# Patient Record
Sex: FEMALE | Race: BLACK OR AFRICAN AMERICAN | NOT HISPANIC OR LATINO | Employment: UNEMPLOYED
[De-identification: names, ages, dates, MRNs, and addresses within clinical notes are randomized per-mention and may not be internally consistent; named-entity substitution may affect disease eponyms.]

---

## 2017-09-07 ENCOUNTER — HOSPITAL ENCOUNTER (OUTPATIENT)
Dept: LAB | Age: 22
Setting detail: SPECIMEN
Discharge: HOME | End: 2017-09-07

## 2017-12-31 ENCOUNTER — HEALTH MAINTENANCE LETTER (OUTPATIENT)
Age: 22
End: 2017-12-31

## 2018-09-18 ENCOUNTER — RECORDS - HEALTHEAST (OUTPATIENT)
Dept: LAB | Facility: CLINIC | Age: 23
End: 2018-09-18

## 2018-09-19 LAB
C TRACH DNA SPEC QL PROBE+SIG AMP: NEGATIVE
N GONORRHOEA DNA SPEC QL NAA+PROBE: NEGATIVE

## 2020-03-10 ENCOUNTER — HEALTH MAINTENANCE LETTER (OUTPATIENT)
Age: 25
End: 2020-03-10

## 2020-11-12 ENCOUNTER — OFFICE VISIT (OUTPATIENT)
Dept: URGENT CARE | Facility: URGENT CARE | Age: 25
End: 2020-11-12
Payer: COMMERCIAL

## 2020-11-12 ENCOUNTER — ANCILLARY PROCEDURE (OUTPATIENT)
Dept: GENERAL RADIOLOGY | Facility: CLINIC | Age: 25
End: 2020-11-12
Attending: PHYSICIAN ASSISTANT
Payer: COMMERCIAL

## 2020-11-12 VITALS
SYSTOLIC BLOOD PRESSURE: 132 MMHG | DIASTOLIC BLOOD PRESSURE: 87 MMHG | WEIGHT: 144 LBS | BODY MASS INDEX: 28.12 KG/M2 | TEMPERATURE: 98.4 F | OXYGEN SATURATION: 100 % | HEART RATE: 105 BPM
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
## 2020-11-12 DIAGNOSIS — R07.89 CHEST WALL PAIN: ICD-10-CM

## 2020-11-12 DIAGNOSIS — S29.011A MUSCLE STRAIN OF ANTERIOR CHEST WALL: ICD-10-CM

## 2020-11-12 DIAGNOSIS — S20.219A CONTUSION OF STERNUM, INITIAL ENCOUNTER: ICD-10-CM

## 2020-11-12 PROCEDURE — 71046 X-RAY EXAM CHEST 2 VIEWS: CPT | Performed by: RADIOLOGY

## 2020-11-12 PROCEDURE — 99203 OFFICE O/P NEW LOW 30 MIN: CPT | Performed by: PHYSICIAN ASSISTANT

## 2020-11-12 ASSESSMENT — ENCOUNTER SYMPTOMS
NECK PAIN: 0
WEAKNESS: 0
COUGH: 0
VOMITING: 0
FEVER: 0
WOUND: 0
NECK STIFFNESS: 0
EYES NEGATIVE: 1
LIGHT-HEADEDNESS: 0
NAUSEA: 0
MYALGIAS: 0
CHILLS: 0
BACK PAIN: 0
RHINORRHEA: 0
DIZZINESS: 0
HEADACHES: 0
BRUISES/BLEEDS EASILY: 0
JOINT SWELLING: 0
PALPITATIONS: 0
ARTHRALGIAS: 0
ENDOCRINE NEGATIVE: 1
RESPIRATORY NEGATIVE: 1
DIARRHEA: 0
SORE THROAT: 0
HEMATOLOGIC/LYMPHATIC NEGATIVE: 1
ALLERGIC/IMMUNOLOGIC NEGATIVE: 1
SHORTNESS OF BREATH: 0
MUSCULOSKELETAL NEGATIVE: 1
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
## 2020-12-27 ENCOUNTER — HEALTH MAINTENANCE LETTER (OUTPATIENT)
Age: 25
End: 2020-12-27

## 2021-04-24 ENCOUNTER — HEALTH MAINTENANCE LETTER (OUTPATIENT)
Age: 26
End: 2021-04-24

## 2021-10-09 ENCOUNTER — HEALTH MAINTENANCE LETTER (OUTPATIENT)
Age: 26
End: 2021-10-09

## 2021-12-29 ENCOUNTER — OFFICE VISIT (OUTPATIENT)
Dept: URGENT CARE | Facility: URGENT CARE | Age: 26
End: 2021-12-29
Payer: COMMERCIAL

## 2021-12-29 VITALS
SYSTOLIC BLOOD PRESSURE: 117 MMHG | BODY MASS INDEX: 26.05 KG/M2 | OXYGEN SATURATION: 100 % | WEIGHT: 133.4 LBS | HEART RATE: 66 BPM | TEMPERATURE: 98.5 F | DIASTOLIC BLOOD PRESSURE: 79 MMHG

## 2021-12-29 DIAGNOSIS — Z33.1 PREGNANT STATE, INCIDENTAL: ICD-10-CM

## 2021-12-29 DIAGNOSIS — N91.2 AMENORRHEA: Primary | ICD-10-CM

## 2021-12-29 LAB — HCG UR QL: POSITIVE

## 2021-12-29 PROCEDURE — 99203 OFFICE O/P NEW LOW 30 MIN: CPT | Performed by: PHYSICIAN ASSISTANT

## 2021-12-29 PROCEDURE — 81025 URINE PREGNANCY TEST: CPT | Performed by: PHYSICIAN ASSISTANT

## 2021-12-29 RX ORDER — PRENATAL VIT/IRON FUM/FOLIC AC 27MG-0.8MG
1 TABLET ORAL DAILY
Qty: 90 TABLET | Refills: 3 | Status: SHIPPED | OUTPATIENT
Start: 2021-12-29

## 2021-12-29 ASSESSMENT — ENCOUNTER SYMPTOMS
FATIGUE: 1
NAUSEA: 1
VOMITING: 1
HEADACHES: 1

## 2021-12-29 NOTE — LETTER
December 29, 2021      Dennies Alicia  [address]   [facility] [address]        To Whom It May Concern,      Please allow Dennies to be on light duty until follow up with doctor.       Sincerely,        Jacob Hallman PA-C

## 2021-12-29 NOTE — PROGRESS NOTES
Assessment & Plan     Amenorrhea  - HCG Qual, Urine ([id number])  - HCG Qual, Urine ([id number])  - Ob/Gyn Referral    Pregnant state, incidental  - Prenatal Vit-Fe Fumarate-FA (PRENATAL MULTIVITAMIN W/IRON) 27-0.8 MG tablet  Dispense: 90 tablet; Refill: 3  - Ob/Gyn Referral     Estimated delivery sate: Aug 26, 2022.  Take daily prenatal vitamin. Referral sent to OBGYN. Handouts given about what to avoid and appropriate diet.     Return in about 2 weeks (around 2022) for Follow up.        Subjective     Dennies is a 26 year old female who presents to clinic today for the following health issues:  Chief Complaint   Patient presents with     Pregnancy Test     LMP 21, breast tenderness, neausea, headache, emesis.     Dennies presents with nausea, breast tenderness, headache, vomiting. . Dennies reports she does not drink or use tobacco. She is engaged currently. She is not taking prenatals at this time. It is an incidental pregnancy. LMP 21          Review of Systems   Constitutional: Positive for fatigue.   Gastrointestinal: Positive for nausea and vomiting.   Breasts:  Positive for tenderness.   Neurological: Positive for headaches.           Objective    /79   Pulse 66   Temp 98.5  F (36.9  C) (Tympanic)   Wt 60.5 kg (133 lb 6.4 oz)   LMP 2021 (Exact Date)   SpO2 100%   BMI 26.05 kg/m    Physical Exam  Constitutional:       Appearance: Normal appearance.   HENT:      Head: Normocephalic and atraumatic.   Cardiovascular:      Rate and Rhythm: Normal rate and regular rhythm.      Heart sounds: Normal heart sounds.   Pulmonary:      Effort: Pulmonary effort is normal.      Breath sounds: Normal breath sounds.   Musculoskeletal:      Cervical back: Normal range of motion and neck supple.   Neurological:      General: No focal deficit present.      Mental Status: She is alert and oriented to person, place, and time.   Psychiatric:         Mood and Affect: Mood normal.         Behavior:  Behavior normal.         Thought Content: Thought content normal.         Judgment: Judgment normal.              Jacob Hallman PA-C

## 2021-12-29 NOTE — PATIENT INSTRUCTIONS
Patient Education     Comfort Tips During Pregnancy  Pregnancy can bring discomfort of different kinds. Below are tips for ways to feel better. Talk with your healthcare provider before using pain-relieving medicine at any time during your pregnancy.     First trimester tips  Easing nausea    Get up slowly. Eat a few unsalted crackers before you get out of bed.    Avoid smells that bother you.    Eat small, bland, low-fat, high-protein meals at frequent intervals.    Sip on water, weak tea, or clear soft drinks, like ginger ale. Eat ice chips.    Try taking vitamin B6.  Coping with fatigue    Take catnaps when you can.    Get regular exercise.    Accept help from others.    Practice good sleep habits, like going to bed and getting up at the same time each day. Use your bed only for sleep and sex.  Calming mood swings    Talk about your feelings with others, including other mothers.    Limit sugar, chocolate, and caffeine.    Eat a healthy diet. Don t skip meals.    Get regular exercise.  Soothing headaches    Get fresh air and exercise.    Relax and get enough rest.    Check with your healthcare provider before taking any pain medicines.    Second trimester tips    To limit ankle swelling, sit with your feet raised or wear support hose.    If you have pain in your groin and stomach (round ligament pain), don't make sudden twisting movements with your body.    For leg cramps, flexing your foot often brings immediate relief. Also try massaging your calf in long, downward strokes, or stretching your legs before going to bed. Get enough exercise and wear shoes with flexible soles. Eat foods rich in calcium.    Third trimester tips  Reducing heartburn    Eat small, light meals throughout the day rather than 3 large ones.    Sleep with your upper body raised 6 inches. Don t lie down until 2 hours after you eat.    Don't eat greasy, fried, or spicy foods.    Don't have citrus fruits or juices.  Treating  constipation    Eat foods high in fiber, such as whole-grain foods, and fresh fruit and vegetables).    Drink plenty of water.    Get regular exercise.    Ask about your healthcare provider about medicines that have docusate or psyllium.  Taking care of your breasts    Don't use harsh soaps or alcohol, which can make your skin too dry.    Wear nursing bras. They provide more support than regular bras and can be used after pregnancy if you breastfeed.  Getting a good night s sleep     Take a warm shower before bed.    Sleep on a firm mattress.    Lie on your side with 1 leg crossed over the other.    Use pillows to support your arms, legs, and belly.    [employer] last reviewed this educational content on 8/1/2020 2000-2021 [employer]. All rights reserved. This information is not intended as a substitute for professional medical care. Always follow your healthcare professional's instructions.           Patient Education     Established Pregnancy, Normal Symptoms    You are pregnant and are having symptoms that worry you. During pregnancy, it s normal to have many kinds of symptoms. Here is a list of common symptoms that happen during pregnancy.   Circulation changes    Bleeding gums    Headaches    Nosebleeds    Mild blurriness of vision, especially with contact lenses    Stuffy nose    Dizziness and fainting    Extra saliva    Skin color changes on your face    Stuffy nose    Swollen hands, legs, and feet    Swollen leg veins    Breast and skin changes    Darkening of nipples    Yellow or white discharge from the nipples    Sore breasts and nipples    Swollen breasts    Dry, itchy skin    Skin color changes on your face    Muscle and joint changes    Back, hip, or thigh pain    Leg cramps that come and go    Numbness and tingling in your hands and fingers    Urinary and bowel changes    Constipation    Feeling of pressure on your bladder and stomach    Need to urinate often    Gas and  bloating    Heartburn    Anal itching, swelling, and bleeding (hemorrhoids)    Leaking urine    Mild pressure or cramping in your belly    Nausea and vomiting throughout the day or night (morning sickness)    Swollen belly    Clear to white vaginal discharge    Mood and thinking changes    Forgetfulness    Less interest in sex    Mood swings    Tiredness    Trouble sleeping    Home care  Here is information that may help relieve some common pregnancy symptoms.   Sore and swollen breasts    Wear a support bra that fits properly.  Nausea and indigestion    Eat smaller meals or snacks more often.    Eat bland foods, such as bananas, crackers, or rice.    Stay away from spicy, fatty, or fried foods.    Stay away from alcohol, caffeine, and tobacco.    Don t lie down right after eating.    Raise your head with pillows when you lie down.    Eat foods or beverages that have ginger. If you drink ginger ale, be sure to make sure it has real ginger and not just ginger flavoring.  Leg swelling and varicose veins     Wear elastic support hose. Put your feet up as often as possible.  Constipation    Eat more fresh fruits and vegetables and more whole grains. Drink more clear liquids.  Joint and muscle pain    Avoid heavy lifting.    Pick things up by bending at your knees, not at your waist.    Use acetaminophen for joint and muscle pain. Don't use aspirin, ibuprofen, or naproxen.  Mouth and nose dryness or bleeding     Drink more liquids. Use a vaporizer or humidifier in your bedroom.  Don t take medicines or use remedies that your healthcare provider hasn t approved. If you have symptoms that are severe or sudden, call your healthcare provider.   Call 911  Call 911 if any of these occur:     New chest, arm, shoulder, neck, or upper back pain    Trouble breathing    Severe belly pain or very heavy bleeding    Severe lightheadedness, passing out, or fainting    Rapid heart rate    Confusion or trouble waking up  When to seek  medical advice  Call your healthcare provider right away if any of these occur:     Burning or pain when you urinate    Depression or severe anxiety    Desire to eat or drink nonfood items such as paper, dirt, or cleaning products    Diarrhea that lasts more than 24 hours    Fast heartbeat or heart palpitations    Fever of 100.4 F (38 C) or higher, or as directed by your healthcare provider    You can t keep fluids down for 6 hours without vomiting    Severe or ongoing vomiting    Little or no urine    Major vision changes    Moderate or severe belly pain    Severe back pain    Severe constipation    Severe cramping or swelling in a leg, especially if it s just on one side    Severe headache    Sudden swelling of your face, hands, feet, or ankles    Vaginal bleeding    Very itchy skin that doesn t get better  [employer] last reviewed this educational content on 11/1/2019 2000-2021 [employer]. All rights reserved. This information is not intended as a substitute for professional medical care. Always follow your healthcare professional's instructions.           Patient Education     Healthy Eating Habits During Pregnancy    It s important to develop healthy eating habits while you are pregnant, for you as well as for your baby. Here are some ways to stay healthy.  Aim for a healthy weight  A slow, steady rate of weight gain is often best. After the first trimester, you may gain about a pound (0.45 kg) a week. If you were overweight before pregnancy, you need to gain fewer pounds (kilograms). Your healthcare provider can give you a healthy weight goal for your pregnancy.  Don t diet  Now is not the time to diet. You may not get enough of the nutrients you and your baby need. Instead, learn how to be a healthy eater. Start by doing it for your baby. Soon, you may do it for yourself.  Vitamins and supplements  Talk with your healthcare provider about taking these and other prenatal vitamins and  supplements.    Iron makes the extra blood you need now.    Calcium and vitamin D help build and keep strong bones.    Folic acid helps prevent certain birth defects.    Iodine helps the thyroid work right.    Some vitamins may not be safe to take. Your healthcare provider will tell you which ones to avoid.  Fluids  Drink at least 8 to 10 cups of fluid daily. Your baby needs fluids. Fluids also decrease constipation, flush out toxins and waste, limit swelling, and help prevent bladder infections. Water is best. Other good choices are:    Water or seltzer water with a slice of lemon or lime (These can also help ease an upset stomach.)    Clear soups that are low in salt    Low-fat or fat-free milk, soy or rice milk with calcium added    Popsicles or gelatin  Things to avoid  Some things might harm your growing baby. Don t eat or drink:    Alcohol    Unpasteurized dairy foods and juices    Raw or undercooked meat, poultry, fish, or eggs    Unwashed fruits and vegetables    Prepared meats, like deli meats or hot dogs, unless heated until steaming hot    Fish that are high in mercury, like shark, swordfish, la mackerel, tilefish, and albacore tuna  Things to limit  Ask your healthcare provider whether it s safe to eat or drink:    Caffeine    Artificial sweeteners    Organ meats    Certain types of fish    Fish and shellfish that contain mercury in lower amounts, like shrimp, canned light tuna, salmon, pollock, and catfish  [employer] last reviewed this educational content on 2/1/2018 2000-2021 [employer]. All rights reserved. This information is not intended as a substitute for professional medical care. Always follow your healthcare professional's instructions.           Patient Education     Pregnancy    Your exam today shows that you are pregnant.  Pregnancy symptoms  During pregnancy your body s hormones change. This causes physical and emotional changes. This is normal. Knowing what to expect is  important for your piece of mind and so you know when to seek help for a problem. Here are some of the most common symptoms:     Morning sickness or nausea. This can happen any time of the day or night.    Tender, swollen breasts    Need to urinate frequently    Tiredness or fatigue    Dizziness    Indigestion or heartburn    Food cravings or turn-offs    Constipation    Emotional changes. This can range from anxiety to excitement to depression.  General care for a healthy pregnancy  Here are things you can do to help make sure your baby is born healthy:    Rest when you feel tired. This is especially true in the later months of pregnancy.    Drink more fluids. Your body needs more fluids than you may be used to. Drink 8 to10 glasses of juice, milk, or water every day.    Eat well-balanced meals. Eat at regular times to give your body enough protein. You can expect to gain about 30 pounds during the pregnancy. Don t try to diet or lose weight while you are pregnant.    Take a prenatal vitamin every day. This helps you meet the extra nutritional needs of pregnancy.    Don t take any other medicine during your pregnancy unless your healthcare provider tells you to. This includes prescription medicines and those you buy over the counter. Many medicines can harm the growing baby.    If you have nausea or vomiting, don t eat greasy or fried foods. Eat several smaller meals throughout the day rather than 3 large meals.    If you smoke, you must stop. The nicotine you breathe in goes right to the baby.    Stay away from alcohol, even in moderate amounts. Daily drinking will harm your baby and can cause permanent brain damage.    Don t use recreational drugs, especially cocaine, crack, and heroin. These will harm your baby. Also avoid marijuana.    If you were using recreational drugs or prescribed medicine when you found out that you were pregnant, talk with your healthcare provider about possible effects on your growing  baby.    If you have medical problems that you need to take medicine for, talk with your healthcare provider.  Follow-up care  Call your healthcare provider to arrange for prenatal care. Prenatal care is important. You can see your family provider, a pregnancy specialist (obstetrician), a midwife, or a primary care clinic.   When to seek medical advice  Call your healthcare provider right away if any of these occur:    Vaginal bleeding    Pain in your belly (abdomen) or back that is moderate or severe    Lots of vomiting, or you can t keep any fluids down for 6 hours    Burning feeling when you urinate    Headache, dizziness, or rapid weight gain    Fever    Vision changes or blurred vision  [employer] last reviewed this educational content on 2019-2021 [employer]. All rights reserved. This information is not intended as a substitute for professional medical care. Always follow your healthcare professional's instructions.           Patient Education     Nutrition During Pregnancy   Having a healthy baby depends mostly on you. What you eat matters to your baby and your health. During pregnancy, you will likely need about 300 more calories per day than before you became pregnant. Each day, try to eat the number of servings listed here for each food group. In addition, cut down on salt and caffeine. Limit the amount of sweets and high-fat foods you eat. Don t smoke or drink alcohol.     Important: See your healthcare provider as often as requested. If you have any questions, be sure to ask them.   Fruits Vegetables Grains & Cereals* Fats & Oils   2 cups  Examples of 1-cup servings:   1 medium apple  1 medium orange  1 medium banana  1 cup chopped fruit  1 cup 100% fruit juice (pasteurized)   1/2 cup dried fruit 2-1/2 to 3 cups   Examples of 1 servin cups raw, leafy greens   1 cup raw or cooked cut-up vegetables   1 cup 100% vegetable juice (pasteurized)  6 to 8 ounces  Examples of  1-ounce servings:   1 slice bread  1/2 cup cooked rice  1/2 cup cooked cereal   1/2 cup pasta  1 ounce cold cereal 6 to 8 teaspoons   Dairy** Protein--- Fluids      3 cups  Examples of 1-cup servings:   1 cup milk  1 cup yogurt  1-1/2 ounces natural cheese   2 ounces processed cheese  5 to 6-1/2 ounces  Examples of 1-ounce servings:   1 egg  1 ounce of lean meat, poultry, or fish   1/4 cup cooked beans   1 tablespoon peanut butter   1/2 ounce nuts 8 or more 8-ounce glasses   Examples:  Water  Mineral water  Clear soups, broth      *Note: Choose whole grains whenever possible.   ** Note: Try to choose low-fat options; stay away from soft cheeses and unpasteurized milk.   --- Notes: Don't eat raw or undercooked meats, eggs, seafood, fish, and shellfish. Also, some types of fish, such as shark, swordfish, and la mackerel, should not be eaten during pregnancy. Don't eat hot dogs, lunch meats, or cold cuts unless heated to steaming just before being served. Ask your healthcare provider about safe choices.   Prenatal supplements  A prenatal supplement is a pill that you take daily during pregnancy. It helps make sure you re getting the right amount of certain nutrients that are important to your baby. Ask your healthcare provider to help you choose the best one for you. Important nutrients during pregnancy include:     Folic acid. It's best to start taking this supplement 1 month before you start trying to get pregnant. Folic acid helps prevent certain problems in your baby. During pregnancy, you need to take 400 micrograms (mcg) of folic acid every day for the first 2 to 3 months after conception. After that, 600 mcg is needed for a growing baby and placenta.    Iron, calcium, and vitamin D. You may also be advised to take these supplements during pregnancy. They help keep you and your baby healthy. Take them at different times because calcium makes it hard for the body to absorb iron. Taking iron with orange juice  "helps to increase its absorption.  [employer] last reviewed this educational content on 8/1/2020 2000-2021 [employer]. All rights reserved. This information is not intended as a substitute for professional medical care. Always follow your healthcare professional's instructions.           Patient Education     Pregnancy: Your First Trimester Changes  The first trimester is a time of rapid development for your baby. Because your baby is growing so quickly, it is important that you start a healthy lifestyle right away. By the end of the first trimester, your baby has formed all of its major body organs and weighs just over an ounce.      Actual size of baby is 1/4\"    Month 1 (weeks 1 to 4)  The placenta (the organ that nourishes your baby) begins to form. The brain, spinal cord, heart, gastrointestinal tract, and lungs begin to develop. Your baby is about   inch long by the end of the first month.      Actual size of baby is 1\"    Month 2 (weeks 5 to 8)  All of your baby s major body organs form. The face, fingers, toes, ears, and eyes appear. By the end of the month, your baby is about 1 inch long.      Actual size of baby is 3\"      Month 3 (weeks 9 to 12)  Your baby can open and close its fists and mouth. The sexual organs begin to form. As the first trimester ends, your baby is about 3 inches long.   [employer] last reviewed this educational content on 8/1/2020 2000-2021 [employer]. All rights reserved. This information is not intended as a substitute for professional medical care. Always follow your healthcare professional's instructions.           Patient Education     Pregnancy: Common Questions  There are plenty of myths and  old wives  tales  about pregnancy. You may need help  fact from fiction. On this sheet, you ll find answers to a few common questions. If you have other questions, talk with your healthcare provider.    Will working harm my baby?  In most cases, " working throughout your pregnancy is not harmful at all. There may be concerns if the job involves dangerous machinery or chemicals, lifting, or standing for very long periods of time. Talk with your healthcare provider and employer about your particular job and pregnancy.  Is it safe to have sex during pregnancy?  Yes, unless you are specifically advised not to by your healthcare provider.  Why can t I change the cat litter box?  Cats carry a disease called toxoplasmosis. In adult humans, it shows up as a mild infection of the blood and organs. If you are infected during pregnancy, the baby s brain and eyes could be damaged. To be safe, have someone else change the litter. If you must handle it, wear a paper mask over your nose and mouth. Also, wear gloves and wash your hands afterward.  Which medicines are safe?  No prescription or over-the-counter medicine is safe for everyone all of the time. But sometimes medicines are needed. Be sure your healthcare provider knows you are pregnant. Then use only the medicines he or she advises you to take.  Is it true that I can overheat my baby?  Yes. To prevent making your baby too warm:    Don t sit in a Jacuzzi. A long, warm bath is fine, but not in water over 100 F (37.7 C).    Exercise less intensely if you feel tired. Base your workout on how you feel, not your heart rate. Heart rates aren t a good way to measure effort during pregnancy.  Can I lift and carry safely?  Yes, if your healthcare provider doesn t tell you otherwise. Learn to lift and carry safely to prevent injury and reduce back pain during pregnancy. To protect your back:    Bend at the knees to bring the load nearer.    Get a good . Test the weight of the load.    Tighten your belly. Exhale as you lift.    Lift with your legs, not with your back.    Carry the load close to your body.    Hold the load so you can see where you are going.  What if I get sick?  Most women get sick at least once during  pregnancy. Talk with your healthcare provider if you do. Most likely it will not affect your pregnancy. Get plenty of rest and fluids, and eat what you can. Talk with your provider before taking any medicines.  [employer] last reviewed this educational content on 8/1/2020 2000-2021 [employer]. All rights reserved. This information is not intended as a substitute for professional medical care. Always follow your healthcare professional's instructions.

## 2021-12-30 ENCOUNTER — TELEPHONE (OUTPATIENT)
Dept: FAMILY MEDICINE | Facility: CLINIC | Age: 26
End: 2021-12-30
Payer: COMMERCIAL

## 2021-12-30 NOTE — TELEPHONE ENCOUNTER
Patient calling regarding recent UC visit.  Patient states she found out about pregnancy at last UC visit and was given a written note from  provider requesting that this patient be on light duty until follow up with OBGYN.    Patient states her manager is requesting more information on this. Routing to provider to please review these questions and provide written letter explaining the followin. What is light duty? What will this consist of?  2. How long will she need to be on light duty?      Patient also requesting information on who to call to schedule OBGYN Follow Up. Provided patient with number to call: [phone]    Patient requesting a call when letter is written with clarification. Patient would like to  letter.     Routing to provider this patient met with at  on 2021.        Thank you,  Sophia Vega RN, BSN  [facility]

## 2021-12-30 NOTE — LETTER
January 6, 2022      Dennies Alicia  [address]   [facility] [place] [address]        To Whom It May Concern,      Please allow Dennies light duty meaning not standing fr more than 20 minutes, lifting greater than 10  Pounds, until follow up with provider.           Sincerely,      CARISSA FranceC

## 2022-01-07 ENCOUNTER — MYC MEDICAL ADVICE (OUTPATIENT)
Dept: OBGYN | Facility: CLINIC | Age: 27
End: 2022-01-07

## 2022-01-07 ENCOUNTER — PRENATAL OFFICE VISIT (OUTPATIENT)
Dept: OBGYN | Facility: CLINIC | Age: 27
End: 2022-01-07
Payer: COMMERCIAL

## 2022-01-07 DIAGNOSIS — O21.9 NAUSEA AND VOMITING DURING PREGNANCY: ICD-10-CM

## 2022-01-07 DIAGNOSIS — Z34.01 ENCOUNTER FOR SUPERVISION OF NORMAL FIRST PREGNANCY IN FIRST TRIMESTER: Primary | ICD-10-CM

## 2022-01-07 PROBLEM — Z23 NEED FOR TDAP VACCINATION: Status: ACTIVE | Noted: 2022-01-07

## 2022-01-07 PROBLEM — Z23 NEED FOR TDAP VACCINATION: Status: RESOLVED | Noted: 2022-01-07 | Resolved: 2022-01-07

## 2022-01-07 PROCEDURE — 99207 PR NO CHARGE NURSE ONLY: CPT

## 2022-01-07 RX ORDER — MULTIVITAMIN WITH IRON
100 TABLET ORAL DAILY
COMMUNITY
End: 2022-06-01

## 2022-01-07 RX ORDER — ONDANSETRON 4 MG/1
4-8 TABLET, ORALLY DISINTEGRATING ORAL EVERY 8 HOURS PRN
Qty: 20 TABLET | Refills: 1 | Status: SHIPPED | OUTPATIENT
Start: 2022-01-07 | End: 2022-01-20
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
## 2022-01-07 NOTE — Clinical Note
Reginaldo Alvarez, pt currently 7w0d, intake complete. Pt having N/V unrelieved with vitamin b6 and unisom. Please advise on possible antiemetic prescription. Pt aware when to be seen in ED for hydration. [facility] in [place] is preferred pharmacy. Thank you, DANA Patrick

## 2022-01-11 ENCOUNTER — ANCILLARY PROCEDURE (OUTPATIENT)
Dept: ULTRASOUND IMAGING | Facility: CLINIC | Age: 27
End: 2022-01-11
Attending: OBSTETRICS & GYNECOLOGY
Payer: COMMERCIAL

## 2022-01-11 DIAGNOSIS — Z34.01 ENCOUNTER FOR SUPERVISION OF NORMAL FIRST PREGNANCY IN FIRST TRIMESTER: ICD-10-CM

## 2022-01-11 PROCEDURE — 76801 OB US < 14 WKS SINGLE FETUS: CPT | Performed by: RADIOLOGY

## 2022-01-11 PROCEDURE — 76817 TRANSVAGINAL US OBSTETRIC: CPT | Performed by: RADIOLOGY

## 2022-01-20 ENCOUNTER — PRENATAL OFFICE VISIT (OUTPATIENT)
Dept: OBGYN | Facility: CLINIC | Age: 27
End: 2022-01-20
Payer: COMMERCIAL

## 2022-01-20 VITALS
DIASTOLIC BLOOD PRESSURE: 80 MMHG | BODY MASS INDEX: 25.78 KG/M2 | SYSTOLIC BLOOD PRESSURE: 112 MMHG | WEIGHT: 132 LBS | HEART RATE: 96 BPM

## 2022-01-20 DIAGNOSIS — Z33.1 PREGNANT STATE, INCIDENTAL: ICD-10-CM

## 2022-01-20 DIAGNOSIS — Z34.80 SUPERVISION OF OTHER NORMAL PREGNANCY, ANTEPARTUM: ICD-10-CM

## 2022-01-20 DIAGNOSIS — N91.2 AMENORRHEA: ICD-10-CM

## 2022-01-20 DIAGNOSIS — Z34.01 ENCOUNTER FOR SUPERVISION OF NORMAL FIRST PREGNANCY IN FIRST TRIMESTER: ICD-10-CM

## 2022-01-20 LAB
ABO/RH(D): NORMAL
ANTIBODY SCREEN: NEGATIVE
ERYTHROCYTE [DISTWIDTH] IN BLOOD BY AUTOMATED COUNT: 14.2 % (ref 10–15)
HBV SURFACE AG SERPL QL IA: NONREACTIVE
HCT VFR BLD AUTO: 42.3 % (ref 35–47)
HCV AB SERPL QL IA: NONREACTIVE
HGB BLD-MCNC: 13.6 G/DL (ref 11.7–15.7)
HIV 1+2 AB+HIV1 P24 AG SERPL QL IA: NONREACTIVE
MCH RBC QN AUTO: 25.5 PG (ref 26.5–33)
MCHC RBC AUTO-ENTMCNC: 32.2 G/DL (ref 31.5–36.5)
MCV RBC AUTO: 79 FL (ref 78–100)
PLATELET # BLD AUTO: 304 10E3/UL (ref 150–450)
RBC # BLD AUTO: 5.33 10E6/UL (ref 3.8–5.2)
SPECIMEN EXPIRATION DATE: NORMAL
T PALLIDUM AB SER QL: NONREACTIVE
WBC # BLD AUTO: 5.7 10E3/UL (ref 4–11)

## 2022-01-20 PROCEDURE — 36415 COLL VENOUS BLD VENIPUNCTURE: CPT | Performed by: OBSTETRICS & GYNECOLOGY

## 2022-01-20 PROCEDURE — 87340 HEPATITIS B SURFACE AG IA: CPT | Performed by: OBSTETRICS & GYNECOLOGY

## 2022-01-20 PROCEDURE — 86803 HEPATITIS C AB TEST: CPT | Performed by: OBSTETRICS & GYNECOLOGY

## 2022-01-20 PROCEDURE — 86780 TREPONEMA PALLIDUM: CPT | Performed by: OBSTETRICS & GYNECOLOGY

## 2022-01-20 PROCEDURE — 99207 PR FIRST OB VISIT: CPT | Performed by: OBSTETRICS & GYNECOLOGY

## 2022-01-20 PROCEDURE — 86900 BLOOD TYPING SEROLOGIC ABO: CPT | Performed by: OBSTETRICS & GYNECOLOGY

## 2022-01-20 PROCEDURE — 87086 URINE CULTURE/COLONY COUNT: CPT | Performed by: OBSTETRICS & GYNECOLOGY

## 2022-01-20 PROCEDURE — 86850 RBC ANTIBODY SCREEN: CPT | Performed by: OBSTETRICS & GYNECOLOGY

## 2022-01-20 PROCEDURE — 86901 BLOOD TYPING SEROLOGIC RH(D): CPT | Performed by: OBSTETRICS & GYNECOLOGY

## 2022-01-20 PROCEDURE — 87389 HIV-1 AG W/HIV-1&-2 AB AG IA: CPT | Performed by: OBSTETRICS & GYNECOLOGY

## 2022-01-20 PROCEDURE — 86762 RUBELLA ANTIBODY: CPT | Performed by: OBSTETRICS & GYNECOLOGY

## 2022-01-20 PROCEDURE — 85027 COMPLETE CBC AUTOMATED: CPT | Performed by: OBSTETRICS & GYNECOLOGY

## 2022-01-20 NOTE — PATIENT INSTRUCTIONS
If you have any questions regarding your visit, Please contact your care team.     [facility]: [phone]  Women s Health CLINIC HOURS TELEPHONE NUMBER       Derrek Alvarez M.D.    Celina-DANA Burnett-DANA Betancourt-Medical Assistant    Carrol-  Katie-     Monday-[place]  8:00a.m-4:45 p.m  Tuesday-[place]  9:00a.m-4:00 p.m  Wednesday-[place] 8:00a.m-4:45 p.m.  Thursday-[place]  8:00a.m-4:45 p.m.  Friday-[place]  8:00a.m-4:45 p.m. [facility]  [address] [facility] [place] [address] [phone] ask for [facility]  [phone] Fax  Imaging [phone]    [facility] Labor and Delivery  [address]  [place], [place] [address] [phone]    [facility]  [address]  [place] [address] [phone] ask [facility]  [phone] Fax  Imaging [phone]     Urgent Care locations:    [place]        [place] Monday-Friday  10 am - 8 pm  Saturday and Sunday   9 am - 5 pm  Monday-Friday   10 am - 8 pm  Saturday and Sunday   9 am - 5 pm   [phone]   If you need a medication refill, please contact your pharmacy. Please allow 3 business days for your refill to be completed.  As always, Thank you for trusting us with your healthcare needs!

## 2022-01-21 LAB
BACTERIA UR CULT: NORMAL
RUBV IGG SERPL QL IA: 21.9 INDEX
RUBV IGG SERPL QL IA: POSITIVE

## 2022-01-22 PROBLEM — Z34.80 SUPERVISION OF OTHER NORMAL PREGNANCY, ANTEPARTUM: Status: ACTIVE | Noted: 2022-01-22

## 2022-01-22 NOTE — PROGRESS NOTES
"\"Erlin pederson\" Dennies Paye is a 26 year old year old G 3 P 0 who presents for an initial obstetrical visit.  Referred by self.    Currently experiencing normal pregnancy symptoms without particular problems including pain, bleeding, marked vomiting or weight loss except: nausea and headaches- improved.  This was not a planned pregnancy but accepting. Plans to parent. Conceived with IUD in place and [facility]. Dates per early ultrasound.   No genetic/congenital abnormalities in families.  Here today with sister.  Additional concerns: none.     ROS:     Systems reviewed include constitutional; breast;                 cardiac; respiratory; gastrointestinal; genitourinary;                                musculoskeletal; integumentary; psychological;                                hematologic/lymphatic and endocrine.                  These systems were negative for significant symptoms except                 for the following: none and see above HPI.    Past medical, obstetrical, surgical, family and social history reviewed and as noted or updated in chart.     Allergies, meds and supplements are as noted or updated in chart.                    Episode OB dating, demographic and history forms completed or reviewed.    EXAM:  VS as noted. BMI- Body mass index is 25.78 kg/m .    Relatively recent normal general exam- not repeated now.       Dennies was seen today for prenatal care.    Diagnoses and all orders for this visit:    Encounter for supervision of normal first pregnancy in first trimester  -     Hepatitis C antibody  -     Urine Culture Aerobic Bacterial  -     Treponema Abs w Reflex to RPR and Titer  -     Rubella Antibody IgG  -     HIV Antigen Antibody Combo  -     CBC with platelets  -     Hepatitis B surface antigen  -     ABO/Rh type and screen    Amenorrhea  -     Ob/Gyn Referral    Pregnant state, incidental  -     Ob/Gyn Referral    Supervision of other normal " pregnancy, antepartum        PLAN: Counseling included the following: Advice appropriate to gestational age reviewed including pertinent Checklist items. Discussed condition, tests and general care plan. Symptoms, problems and concerns reviewed. Recommended weight gain discussed. Problem list initiated.   30 minutes spent on the date of encounter doing chart review, history, discussion with patient, and documentation, and further activities as noted, and review of appropriateness of decision-making for care, and review of test results, and interpretation of test results.   Pap due postpartum. Orders as noted. RTC in 4 weeks. Discuss special screening tests next.    Derrek Alvarez MD

## 2022-01-25 ENCOUNTER — MYC MEDICAL ADVICE (OUTPATIENT)
Dept: OBGYN | Facility: CLINIC | Age: 27
End: 2022-01-25
Payer: COMMERCIAL

## 2022-01-28 ENCOUNTER — NURSE TRIAGE (OUTPATIENT)
Dept: NURSING | Facility: CLINIC | Age: 27
End: 2022-01-28
Payer: COMMERCIAL

## 2022-01-29 NOTE — TELEPHONE ENCOUNTER
"Triage Call:    Patient called to report sharp abdominal pain.  She report 10/10 pain from belly button to below her chest in the center of her abdomen.  She reports the pain began a few minutes ago.  Patient denies vomiting or fevers.  She declined to answer additional questions stating \"I'm in too much pain to answer questions.\"  Patient reports her boyfriend is bringing her to the ED now.  She disconnected line.    Gaby Titus RN  01/28/22 10:48 PM  [facility] Nurse Advisor    COVID 19 Nurse Triage Plan/Patient Instructions    Please be aware that novel coronavirus (COVID-19) may be circulating in the community. If you develop symptoms such as fever, cough, or SOB or if you have concerns about the presence of another infection including coronavirus (COVID-19), please contact your health care provider or visit [email or web address].     Disposition/Instructions    ED Visit recommended. Follow protocol based instructions.     Bring Your Own Device:  Please also bring your smart device(s) (smart phones, tablets, laptops) and their charging cables for your personal use and to communicate with your care team during your visit.    Thank you for taking steps to prevent the spread of this virus.  o Limit your contact with others.  o Wear a simple mask to cover your cough.  o Wash your hands well and often.    Resources    [facility]: About COVID-19: [email or web address]    CDC: What to Do If You're Sick: www.cdc.gov/coronavirus/2019-ncov/about/steps-when-sick.html    CDC: Ending Home Isolation: www.cdc.gov/coronavirus/2019-ncov/hcp/disposition-in-home-patients.html     CDC: Caring for Someone: www.cdc.gov/coronavirus/2019-ncov/if-you-are-sick/care-for-someone.html     [facility]: Interim Guidance for Hospital Discharge to Home: www.health.[facility].[place]/diseases/coronavirus/hcp/hospdischarge.pdf    [facility] clinical trials (COVID-19 research studies): " clinicalaffairs.[facility]-clinical-trials     Below are the COVID-19 hotlines at the [place] Department of Health ([facility]). Interpreters are available.   o For health questions: Call 642-249-1358 or 1-981.513.2231 (7 a.m. to 7 p.m.)  o For questions about schools and childcare: Call 687-809-9692 or 1-147.716.4707 (7 a.m. to 7 p.m.)          Reason for Disposition    Caller hangs up    Additional Information    Negative: Shock suspected (e.g., cold/pale/clammy skin, too weak to stand, low BP, rapid pulse)    Negative: Violent behavior, or threatening to physically hurt or kill someone    Negative: [1] Caller is very confused AND [2] no other adult (e.g., friend or family member) available    Negative: Sounds like a life-threatening emergency to the triager    Negative: Child abuse suspected    Negative: Suicide concerns    Negative: Patient sounds very sick or weak to the triager    Negative: [1] [place] worried caller AND [2] second call within 4 hours about the same medical problem    Negative: [1] [place] worried caller AND [2] third call within 48 hours about the same medical problem    Negative: [1] [place] worried caller AND [2] can't be reassured by triager    Negative: [1] Caller demands to speak with the PCP AND [2] about sick adult (or sick caller)    Negative: [1] Angry or rude caller AND [2] doesn't respond to 5 minutes of triager counseling AND [3] sick adult (or caller)    Negative: [1] Caller mainly has complaints about past medical care, billing, etc AND [2] has mild symptoms or is well    Negative: Difficult caller responded to phone counseling    Negative: Caller is requesting health information that triager feels is unethical or illegal    Negative: [1] Caller continues to be verbally abusive AND [2] after triager sets BOUNDARIES AND [3] Triager ends call    Protocols used: DIFFICULT CALLER-A-AH, ABDOMINAL PAIN - FEMALE-A-AH

## 2022-02-22 ENCOUNTER — PRENATAL OFFICE VISIT (OUTPATIENT)
Dept: OBGYN | Facility: CLINIC | Age: 27
End: 2022-02-22
Payer: COMMERCIAL

## 2022-02-22 VITALS
SYSTOLIC BLOOD PRESSURE: 106 MMHG | HEART RATE: 103 BPM | DIASTOLIC BLOOD PRESSURE: 72 MMHG | WEIGHT: 133 LBS | BODY MASS INDEX: 25.97 KG/M2 | OXYGEN SATURATION: 100 %

## 2022-02-22 DIAGNOSIS — Z34.80 SUPERVISION OF OTHER NORMAL PREGNANCY, ANTEPARTUM: ICD-10-CM

## 2022-02-22 PROCEDURE — 99207 PR PRENATAL VISIT: CPT | Performed by: OBSTETRICS & GYNECOLOGY

## 2022-02-22 NOTE — PATIENT INSTRUCTIONS
If you have any questions regarding your visit, Please contact your care team.     [facility] [employer] Services: [phone]  Women s Health CLINIC HOURS TELEPHONE NUMBER       Derrek Alvarez M.D.    Celina-DANA Burnett-DANA Betancourt-Medical Assistant    [place]rol-  Katie-     Monday-[place]  8:00a.m-4:45 p.m  Tuesday-[place]  9:00a.m-4:00 p.m  Wednesday-[place] 8:00a.m-4:45 p.m.  Thursday-[place]  8:00a.m-4:45 p.m.  Friday-[place]  8:00a.m-4:45 p.m. [facility]  [address] [place] [address] [phone] ask for [facility]  [phone] Fax  Imaging [phone]    [facility] Labor and Delivery  [address] [place] [address] [place] [address]  [phone]    [facility]  [address]  [place] [address] [phone] ask [facility]  [phone] Fax  Imaging [phone]     Urgent Care locations:    [facility] Monday-Friday  10 am - 8 pm  Saturday and Sunday   9 am - 5 pm  Monday-Friday   10 am - 8 pm  Saturday and Sunday   9 am - 5 pm   [phone]   If you need a medication refill, please contact your pharmacy. Please allow 3 business days for your refill to be completed.  As always, Thank you for trusting us with your healthcare needs!

## 2022-02-23 NOTE — PROGRESS NOTES
No significant signs, symptoms or concerns except some brownish vaginal spotting for one day and occasional epistaxis.    Counseling included the following: Advice appropriate to gestational age reviewed including pertinent Checklist items. Discussed condition, tests and care plan including risks, benefits, and alternatives. Problem list updated.   20 minutes spent on the date of encounter doing chart review, history, examination, discussion with patient, and documentation, and further activities as noted, and review of appropriateness of decision-making for care, and review of test results, and interpretation of test results.  Discuss special screening tests next.  A/P:  Dennies was seen today for prenatal care.    Diagnoses and all orders for this visit:    Supervision of other normal pregnancy, antepartum        Derrek Alvarez MD

## 2022-03-30 ENCOUNTER — PRENATAL OFFICE VISIT (OUTPATIENT)
Dept: OBGYN | Facility: CLINIC | Age: 27
End: 2022-03-30
Payer: COMMERCIAL

## 2022-03-30 VITALS
SYSTOLIC BLOOD PRESSURE: 114 MMHG | DIASTOLIC BLOOD PRESSURE: 77 MMHG | HEART RATE: 99 BPM | OXYGEN SATURATION: 100 % | BODY MASS INDEX: 25.19 KG/M2 | WEIGHT: 129 LBS

## 2022-03-30 DIAGNOSIS — Z34.80 SUPERVISION OF OTHER NORMAL PREGNANCY, ANTEPARTUM: ICD-10-CM

## 2022-03-30 PROCEDURE — 99207 PR PRENATAL VISIT: CPT | Performed by: OBSTETRICS & GYNECOLOGY

## 2022-03-30 NOTE — PATIENT INSTRUCTIONS
If you have any questions regarding your visit, Please contact your care team.     [facility] Services: [phone]  Women s Health CLINIC HOURS TELEPHONE NUMBER       Derrek Alvarez M.D.    Celina-DANA Burnett-DANA Betancourt-Medical Assistant    [place]rol-  Katie-     Monday-[place]  8:00a.m-4:45 p.m  Tuesday-[place]  9:00a.m-4:00 p.m  Wednesday-[place] 8:00a.m-4:45 p.m.  Thursday-[place]  8:00a.m-4:45 p.m.  Friday-[place]  8:00a.m-4:45 p.m. [facility]  [address] [place] [address] [phone] ask for [facility]  [phone] Fax  Imaging [phone]    [facility] Labor and Delivery  [address] [place] [address] [place] [address]  [phone]    [facility]  [address]  [place] [address] [phone] ask [facility]  [phone] Fax  Imaging [phone]     Urgent Care locations:    [facility] Monday-Friday  10 am - 8 pm  Saturday and Sunday   9 am - 5 pm  Monday-Friday   10 am - 8 pm  Saturday and Sunday   9 am - 5 pm   [phone]   If you need a medication refill, please contact your pharmacy. Please allow 3 business days for your refill to be completed.  As always, Thank you for trusting us with your healthcare needs!

## 2022-03-30 NOTE — PROGRESS NOTES
No significant signs, symptoms or concerns except mild diarrhea for 3 days. Here with partner.  Discussed special diagnostic and screening tests and plans (y = yes, n = no/declined, p = possibly/considering, d = done already, na = not applicable or past time): first trimester scr with NT and later AFP or with cell free DNA and later AFP = na, cell free DNA= n, CF carrier scr = n, hemoglobinopathy scr= n, SMA, fragile X  and other genetic scr= n, genetic amnio/CVS = n, quad scr = p, survey u/s = y, Level 2 survey u/s with MFM = n.  Counseling included the following: Advice appropriate to gestational age reviewed including pertinent Checklist items. Discussed condition, tests and care plan including risks, benefits, and alternatives. Problem list updated.   20 minutes spent on the date of encounter doing chart review, history, examination, discussion with patient, and documentation, and further activities as noted, and review of appropriateness of decision-making for care.  Survey ultrasound next.  A/P:  Dennies was seen today for prenatal care.    Diagnoses and all orders for this visit:    Supervision of other normal pregnancy, antepartum  -     US OB > 14 Weeks; Future        Derrek Alvarez MD

## 2022-04-05 ENCOUNTER — ANCILLARY PROCEDURE (OUTPATIENT)
Dept: ULTRASOUND IMAGING | Facility: CLINIC | Age: 27
End: 2022-04-05
Attending: OBSTETRICS & GYNECOLOGY
Payer: COMMERCIAL

## 2022-04-05 DIAGNOSIS — Z34.80 SUPERVISION OF OTHER NORMAL PREGNANCY, ANTEPARTUM: ICD-10-CM

## 2022-04-05 PROCEDURE — 76805 OB US >/= 14 WKS SNGL FETUS: CPT | Performed by: RADIOLOGY

## 2022-04-14 NOTE — RESULT ENCOUNTER NOTE
Hi,    I am one of the physicians covering for Dr. Alvarez since he retired.  Your ultrasound shows a bright spot on the heart.  This alone does not increase your risk of having a baby with Down Syndrome, as we just discussed on the phone.  This is not a heart defect.  It is seen in normal pregnancies.  It can be seen in babies with Down Syndrome, but usually there are other findings on ultrasound in addition to this finding.  There were no other findings to suggest Down Syndrome.  As we discussed, I have placed a referral for Maternal Fetal Medicine (MFM) so you can get a comprehensive ultrasound.  They should be calling you sometime in the next week to help you schedule.  Please let me know if you have any questions or concerns.     Thanks!  Dr. Marina

## 2022-05-02 ENCOUNTER — PRENATAL OFFICE VISIT (OUTPATIENT)
Dept: OBGYN | Facility: CLINIC | Age: 27
End: 2022-05-02
Payer: COMMERCIAL

## 2022-05-02 VITALS
OXYGEN SATURATION: 100 % | BODY MASS INDEX: 26.48 KG/M2 | SYSTOLIC BLOOD PRESSURE: 109 MMHG | WEIGHT: 135.6 LBS | HEART RATE: 99 BPM | DIASTOLIC BLOOD PRESSURE: 71 MMHG

## 2022-05-02 DIAGNOSIS — Z34.82 ENCOUNTER FOR SUPERVISION OF OTHER NORMAL PREGNANCY, SECOND TRIMESTER: Primary | ICD-10-CM

## 2022-05-02 PROCEDURE — 99207 PR PRENATAL VISIT: CPT | Performed by: OBSTETRICS & GYNECOLOGY

## 2022-05-02 NOTE — PATIENT INSTRUCTIONS
If you have any questions regarding your visit, Please contact your care team.    [facility] Services: [phone]    To Schedule an Appointment 24/7  Call: [phone] CLIN[facility] HOURS TELEPHONE NUMBER   She Burns DO.    TIMUR Gil -  Katie -       Hortencia, RN  Oxana, RN  Celina, RN     [place]  Wednesday and Friday  8:30 a.m-5:00 p.m    [place]porary  Monday 8:30 a.m-5:00 p.m [facility]  [address] [place] [address]  [phone] ask for [facility]    Imaging Scheduling-All Locations [phone]    [facility]  [address] [facility] [place] [address]     Urgent Care locations:  [facility]   Monday-Friday   10 am - 8 pm  Saturday and Sunday   9 am - 5 pm   [phone]     [facility] Labor and Delivery:  [phone]    If you need a medication refill, please contact your pharmacy. Please allow 3 business days for your refill to be completed.  As always, Thank you for trusting us with your healthcare needs!  see additional instructions from your care team below

## 2022-05-16 ENCOUNTER — HEALTH MAINTENANCE LETTER (OUTPATIENT)
Age: 27
End: 2022-05-16

## 2022-05-17 ENCOUNTER — TELEPHONE (OUTPATIENT)
Dept: OBGYN | Facility: CLINIC | Age: 27
End: 2022-05-17
Payer: COMMERCIAL

## 2022-05-17 NOTE — LETTER
[facility] WOMEN'S CLINIC [place]  [address] [place] [address] N  [facility] [address]  [phone]          May 19, 2022    Dennies Paye                                                                                                                     [address]    [facility] [address]            To whom it may concern,    The above patient is under my professional medical care for prenatal care.  She was not able to work from December, 2021, through 5/19/22 due to nausea, dizziness, vomiting and headaches.  Patient's estimated due date is 9/4/22        Sincerely,         She Burns MD

## 2022-05-17 NOTE — TELEPHONE ENCOUNTER
Health Call Center    Phone Message    May a detailed message be left on voicemail: no     Reason for Call: Form or Letter   Type or form/letter needing completion: Pt needs letter for Pending sale to [facility] that Dr Alvarez told patient that she needed time off from work to rest.    Provider: Dr Alvarez (before he retired).  Unemployment needs letter that she could not work.  Patient asking for December 2021 through the current date.   Date form needed: [place] State, unemployment.   Fax: [phone]  Issue ID#: [id number]  Lou ID#:  [id number]    Symptoms: Nausea, dizzy, throwing up, headaches. Delivery date September 2022      Action Taken: Message routed to:  [facility] p [phone]    Travel Screening: Not Applicable

## 2022-05-19 NOTE — TELEPHONE ENCOUNTER
"She Burns, DO  P Mg Ob/Gyn Triage  Caller: Unspecified (2 days ago, 12:38 PM)  \"Please write up this letter and have it signed when I am back in clinic or by one of my colleagues.  The patient did not mention this at her last visit.\"    Letter written.  Flagging for clinic RN to print, have clinic MD sign and fax to below number.    Hortencia Stafford RN    "

## 2022-06-01 ENCOUNTER — MYC MEDICAL ADVICE (OUTPATIENT)
Dept: OBGYN | Facility: CLINIC | Age: 27
End: 2022-06-01

## 2022-06-01 ENCOUNTER — PRENATAL OFFICE VISIT (OUTPATIENT)
Dept: OBGYN | Facility: CLINIC | Age: 27
End: 2022-06-01
Payer: COMMERCIAL

## 2022-06-01 VITALS
SYSTOLIC BLOOD PRESSURE: 113 MMHG | OXYGEN SATURATION: 100 % | DIASTOLIC BLOOD PRESSURE: 73 MMHG | HEIGHT: 60 IN | BODY MASS INDEX: 27.76 KG/M2 | WEIGHT: 141.4 LBS | HEART RATE: 98 BPM

## 2022-06-01 DIAGNOSIS — Z34.82 ENCOUNTER FOR SUPERVISION OF OTHER NORMAL PREGNANCY, SECOND TRIMESTER: ICD-10-CM

## 2022-06-01 DIAGNOSIS — Z34.80 SUPERVISION OF OTHER NORMAL PREGNANCY, ANTEPARTUM: Primary | ICD-10-CM

## 2022-06-01 LAB
GLUCOSE 1H P 50 G GLC PO SERPL-MCNC: 116 MG/DL (ref 70–129)
HGB BLD-MCNC: 10.3 G/DL (ref 11.7–15.7)

## 2022-06-01 PROCEDURE — 36415 COLL VENOUS BLD VENIPUNCTURE: CPT | Performed by: NURSE PRACTITIONER

## 2022-06-01 PROCEDURE — 82950 GLUCOSE TEST: CPT | Performed by: NURSE PRACTITIONER

## 2022-06-01 PROCEDURE — 99207 PR PRENATAL VISIT: CPT | Performed by: NURSE PRACTITIONER

## 2022-06-01 ASSESSMENT — PAIN SCALES - GENERAL: PAINLEVEL: NO PAIN (0)

## 2022-06-01 NOTE — TELEPHONE ENCOUNTER
"Per today's, 6/1, lab result note:  \"Your glucose result was normal today but you are mildly anemic with a hgb value of 10.3 so please take one extra iron tablet daily (FeSO4 325 mg).\"    Pt was had some questions about the above message she received in [facility].  Called pt and discussed the results and recommendations with her. Pt will purchase OTC iron and take daily with a vitamin C drink.    Hortencia Stafford RN    "

## 2022-06-01 NOTE — PROGRESS NOTES
Patient presents for routine prenatal visit. Prenatal flowsheet reviewed and updated as needed.  Denies vaginal bleeding, loss of fluid, contractions or cramping. Denies headache, nausea/vomiting, upper abdominal pain, vision changes, lower extremity swelling, chest pain or shortness of breath.     Anticipatory guidance appropriate for gestational age reviewed.  PE: See OB vitals    Routine prenatal care:  Labs today.  Discussed Tdap on return to clinic.    Questions asked and answered. Next OB visit in 2 week(s) with OB Physician.    Iris ALCARAZ CNP

## 2022-06-01 NOTE — PATIENT INSTRUCTIONS
If you have any questions regarding your visit, Please contact your care team.     [facility]: [phone]  To Schedule an Appointment 24/7  Call: [phone] HOURS TELEPHONE NUMBER     Iris Vargas- APRN CNP      Ashley Patrick-DANA Burnett-RN  Masha Branham-Surgery Scheduler  Katie-Surgery Scheduler         Monday 7:30 am-5:00 pm    Tuesday 8:00 am-4:00 pm    Wednesday 7:30 am-4:00 pm  [place]    Thursday 8:00 am-11:00 am    Friday 7:30 am-4:00 pm [address] [place] [address] [phone] ask for [facility] [place]  [phone] Fax    Imaging Scheduling all locations  [phone]     [facility] Labor and Delivery  [address] [place] [address]   [place], [place] [id number] [phone]         Urgent Care locations:  [facility]   Monday-Friday  10 am - 8 pm  Saturday and Sunday   9 am - 5 pm     [phone]   If you need a medication refill, please contact your pharmacy. Please allow 3 business days for your refill to be completed.  As always, Thank you for trusting us with your healthcare needs!      see additional instructions from your care team below

## 2022-06-15 ENCOUNTER — PRENATAL OFFICE VISIT (OUTPATIENT)
Dept: OBGYN | Facility: CLINIC | Age: 27
End: 2022-06-15
Payer: COMMERCIAL

## 2022-06-15 VITALS
WEIGHT: 141.6 LBS | HEART RATE: 106 BPM | DIASTOLIC BLOOD PRESSURE: 68 MMHG | BODY MASS INDEX: 27.65 KG/M2 | SYSTOLIC BLOOD PRESSURE: 107 MMHG | OXYGEN SATURATION: 100 %

## 2022-06-15 DIAGNOSIS — Z23 NEED FOR DIPHTHERIA-TETANUS-PERTUSSIS (TDAP) VACCINE: ICD-10-CM

## 2022-06-15 DIAGNOSIS — Z34.82 ENCOUNTER FOR SUPERVISION OF OTHER NORMAL PREGNANCY, SECOND TRIMESTER: Primary | ICD-10-CM

## 2022-06-15 PROCEDURE — 90471 IMMUNIZATION ADMIN: CPT | Performed by: OBSTETRICS & GYNECOLOGY

## 2022-06-15 PROCEDURE — 90715 TDAP VACCINE 7 YRS/> IM: CPT | Performed by: OBSTETRICS & GYNECOLOGY

## 2022-06-15 PROCEDURE — 99207 PR PRENATAL VISIT: CPT | Performed by: OBSTETRICS & GYNECOLOGY

## 2022-06-15 RX ORDER — FERROUS SULFATE 325(65) MG
325 TABLET ORAL
COMMUNITY
End: 2022-10-12

## 2022-06-15 RX ORDER — PYRIDOXINE HCL (VITAMIN B6) 25 MG
25 TABLET ORAL DAILY
COMMUNITY
End: 2022-10-12

## 2022-06-15 RX ORDER — POLYETHYLENE GLYCOL 3350 17 G/17G
17 POWDER, FOR SOLUTION ORAL PRN
COMMUNITY
Start: 2022-06-02 | End: 2022-10-12

## 2022-06-15 NOTE — NURSING NOTE
Prior to immunization administration, verified patients identity using patient s name and date of birth. Please see Immunization Activity for additional information.     Screening Questionnaire for Adult Immunization    Are you sick today?   No   Do you have allergies to medications, food, a vaccine component or latex?   Yes   Have you ever had a serious reaction after receiving a vaccination?   No   Do you have a long-term health problem with heart, lung, kidney, or metabolic disease (e.g., diabetes), asthma, a blood disorder, no spleen, complement component deficiency, a cochlear implant, or a spinal fluid leak?  Are you on long-term aspirin therapy?   No   Do you have cancer, leukemia, HIV/AIDS, or any other immune system problem?   No   Do you have a parent, brother, or sister with an immune system problem?   No   In the past 3 months, have you taken medications that affect  your immune system, such as prednisone, other steroids, or anticancer drugs; drugs for the treatment of rheumatoid arthritis, Crohn s disease, or psoriasis; or have you had radiation treatments?   No   Have you had a seizure, or a brain or other nervous system problem?   No   During the past year, have you received a transfusion of blood or blood    products, or been given immune (gamma) globulin or antiviral drug?   No   For women: Are you pregnant or is there a chance you could become       pregnant during the next month?   Yes   Have you received any vaccinations in the past 4 weeks?   No     Immunization questionnaire was positive for at least one answer.  Notified [facility].        Per orders of Dr. Gomez, injection of Tdap given by Elizabeth Gongora CMA. Patient instructed to remain in clinic for 15 minutes afterwards, and to report any adverse reaction to me immediately.       Screening performed by Elizabeth Gongora CMA on 6/15/2022 at 10:31 AM.

## 2022-06-23 NOTE — PROGRESS NOTES
She reports feeling reassuring daily fetal activity and will continue to record.  She gained 6 lbs since her last visit and denies any fluid leakage or regular uterine contractions.  She states that she went to [facility] [place] [facility] for a f/u US and was told that results were normal.  She understands to allow an hour for her next visit since she will be due for the diabetic screen and hgb draw at that time.   With recent hospitalization, needs NH care, ordered Pt/OT to  Improve gait, transfer, ADLs, strength, and endurance

## 2022-06-29 ENCOUNTER — PRENATAL OFFICE VISIT (OUTPATIENT)
Dept: OBGYN | Facility: CLINIC | Age: 27
End: 2022-06-29
Payer: COMMERCIAL

## 2022-06-29 VITALS
BODY MASS INDEX: 28 KG/M2 | HEART RATE: 113 BPM | SYSTOLIC BLOOD PRESSURE: 124 MMHG | WEIGHT: 142.6 LBS | DIASTOLIC BLOOD PRESSURE: 83 MMHG | OXYGEN SATURATION: 100 % | HEIGHT: 60 IN

## 2022-06-29 DIAGNOSIS — Z34.80 SUPERVISION OF OTHER NORMAL PREGNANCY, ANTEPARTUM: Primary | ICD-10-CM

## 2022-06-29 PROCEDURE — 99207 PR PRENATAL VISIT: CPT | Performed by: NURSE PRACTITIONER

## 2022-06-29 ASSESSMENT — PAIN SCALES - GENERAL: PAINLEVEL: NO PAIN (0)

## 2022-06-29 NOTE — PROGRESS NOTES
Patient presents for routine prenatal visit. Prenatal flowsheet reviewed and updated as needed.  Denies vaginal bleeding, loss of fluid, contractions or cramping. Denies headache, nausea/vomiting, upper abdominal pain, vision changes, lower extremity swelling, chest pain or shortness of breath.     Anticipatory guidance appropriate for gestational age reviewed.  PE: See OB vitals    Routine prenatal care:  Anemia-tolerating iron-check HGB on return to clinic.    Questions asked and answered. Next OB visit in 2 week(s) with OB Physician.    Iris ALCARAZ CNP

## 2022-06-29 NOTE — PATIENT INSTRUCTIONS
If you have any questions regarding your visit, Please contact your care team.     [facility]: [phone]  To Schedule an Appointment 24/7  Call: [phone] HOURS TELEPHONE NUMBER     Iris Vargas- APRN CNP      Ashley Patrick-DANA Burnett-RN  Masha Branham-Surgery Scheduler  Katie-Surgery Scheduler         Monday 7:30 am-5:00 pm    Tuesday 8:00 am-4:00 pm    Wednesday 7:30 am-4:00 pm  [place]    Thursday 8:00 am-11:00 am    Friday 7:30 am-4:00 pm [address] [place] [address] [phone] ask for [facility]  [phone] Fax    Imaging Scheduling all locations  [phone]     [facility] [place] [facility] Labor and Delivery  [address] [place] [address]   [place], [place] [id number] [phone]         Urgent Care locations:  [facility]   Monday-Friday  10 am - 8 pm  Saturday and Sunday   9 am - 5 pm     [phone]   If you need a medication refill, please contact your pharmacy. Please allow 3 business days for your refill to be completed.  As always, Thank you for trusting us with your healthcare needs!      see additional instructions from your care team below

## 2022-07-12 NOTE — PROGRESS NOTES
Patient presents for routine prenatal visit. Prenatal flowsheet reviewed and updated as needed.  Denies vaginal bleeding, loss of fluid, contractions or cramping. Denies headache, nausea/vomiting, upper abdominal pain, vision changes, lower extremity swelling, chest pain or shortness of breath.     Patient seen at [facility] L&D 1 week ago for pain and leaking fluid.   Amnisure, GBS and Fetal Fibronectin negative. Cervix 1 cm, 50%, soft, posterior and high. EFM reassuring and ultrasound confirmed normal KEN. Patient was given magnesium sulfate, Bethamethasone x 2 doses 12 hours apart and Ampicillin x 1 dose. Declined Tocolytics.   Since discharge, no further leaking of fluid, contractions/cramping/pain.     Anticipatory guidance appropriate for gestational age reviewed.  PE: See OB vitals    Routine prenatal care:  Anemia-on iron supplements-recheck HGB today.    We reviewed her L&D notes. Patient has had no further concerning symptoms. We discussed close monitoring for symptoms, reviewed when to proceed to L&D vs call clinic and she verbalizes understanding.    Questions asked and answered. Next OB visit in 2 week(s) with OB Physician.    Iris ALCARAZ CNP

## 2022-07-13 ENCOUNTER — PRENATAL OFFICE VISIT (OUTPATIENT)
Dept: OBGYN | Facility: CLINIC | Age: 27
End: 2022-07-13
Payer: COMMERCIAL

## 2022-07-13 VITALS
HEART RATE: 117 BPM | BODY MASS INDEX: 28.47 KG/M2 | HEIGHT: 60 IN | OXYGEN SATURATION: 99 % | SYSTOLIC BLOOD PRESSURE: 114 MMHG | WEIGHT: 145 LBS | DIASTOLIC BLOOD PRESSURE: 78 MMHG

## 2022-07-13 DIAGNOSIS — O99.013 ANEMIA DURING PREGNANCY IN THIRD TRIMESTER: ICD-10-CM

## 2022-07-13 DIAGNOSIS — Z34.80 SUPERVISION OF OTHER NORMAL PREGNANCY, ANTEPARTUM: Primary | ICD-10-CM

## 2022-07-13 LAB — HGB BLD-MCNC: 10.9 G/DL (ref 11.7–15.7)

## 2022-07-13 PROCEDURE — 85018 HEMOGLOBIN: CPT | Performed by: NURSE PRACTITIONER

## 2022-07-13 PROCEDURE — 99207 PR PRENATAL VISIT: CPT | Performed by: NURSE PRACTITIONER

## 2022-07-13 PROCEDURE — 36415 COLL VENOUS BLD VENIPUNCTURE: CPT | Performed by: NURSE PRACTITIONER

## 2022-07-13 ASSESSMENT — PAIN SCALES - GENERAL: PAINLEVEL: NO PAIN (0)

## 2022-07-13 NOTE — PATIENT INSTRUCTIONS
If you have any questions regarding your visit, Please contact your care team.     [employer] [employer]: [phone]  To Schedule an Appointment 24/7  Call: [phone]'s [place] HOURS TELEPHONE NUMBER     Iris Vargas- APRN CNP      Ashley Patrick-DANA Burnett-DANA Branham-Surgery Scheduler  Katie-Surgery Scheduler         Monday 7:30 am-5:00 pm    Tuesday 8:00 am-4:00 pm    Wednesday 7:30 am-4:00 pm  [place]    Thursday 8:00 am-11:00 am    Friday 7:30 am-4:00 pm [place] [address] [place] [address] [phone] ask for [facility]  [phone] Fax    Imaging Scheduling all locations  [phone]     [facility] Labor and Delivery  [address] [place] [address] Dr.  [place], [place] [id number] [phone]         Urgent Care locations:  [facility]   Monday-Friday  10 am - 8 pm  Saturday and Sunday   9 am - 5 pm     [phone]   If you need a medication refill, please contact your pharmacy. Please allow 3 business days for your refill to be completed.  As always, Thank you for trusting us with your healthcare needs!      see additional instructions from your care team below

## 2022-07-25 ENCOUNTER — PRENATAL OFFICE VISIT (OUTPATIENT)
Dept: OBGYN | Facility: CLINIC | Age: 27
End: 2022-07-25
Payer: COMMERCIAL

## 2022-07-25 VITALS
HEART RATE: 103 BPM | DIASTOLIC BLOOD PRESSURE: 75 MMHG | SYSTOLIC BLOOD PRESSURE: 117 MMHG | WEIGHT: 146.8 LBS | BODY MASS INDEX: 28.67 KG/M2 | OXYGEN SATURATION: 100 %

## 2022-07-25 DIAGNOSIS — Z34.83 ENCOUNTER FOR SUPERVISION OF OTHER NORMAL PREGNANCY, THIRD TRIMESTER: Primary | ICD-10-CM

## 2022-07-25 PROCEDURE — 99207 PR PRENATAL VISIT: CPT | Performed by: OBSTETRICS & GYNECOLOGY

## 2022-07-25 NOTE — PROGRESS NOTES
She reports feeling reassuring daily fetal activity and will continue to record.  She gained 1.8 lbs since her last visit and denies any regular uterine contractions or fluid leakage.  She admits to irregular Rodo-Sanchez contractions which appear mild.  Her boyfriend is with her today and he requests paternity testing since he questions if he is the father.  She states that he is the fob so they would like to obtain paternity testing.  Instructions for getting this information from the [facility] office were provided and since they live in [facility], they are to go there.  I cervical exam was not performed today but her last check at [facility] demonstrated that she was 1 cm/50%/high/intact.  Her last hgb value was 10.9 on 7/13/2022 so this was not rechecked today.  She is to continue taking her po iron replacement therapy.

## 2022-07-25 NOTE — PATIENT INSTRUCTIONS
If you have any questions regarding your visit, Please contact your care team.    [employer]: [phone]    To Schedule an Appointment 24/7  Call: [phone] HOURS TELEPHONE NUMBER   She Burns DO.    TIMUR Gil -Surgery Scheduler  Katie - Surgery Scheduler    Hortencia, DANA Dao, RN  Celina, DANA   [place]  Wednesday and Friday  8:30 a.m-5:00 p.m    [place]-Temporary  Monday 8:30 a.m-5:00 p.m  Typical Surgery day:  Tuesday [facility]  [address] [place] [address] [phone] Phone  [phone] Fax    Imaging Scheduling-All Locations [phone]    [facility]  [address] [place] [address]     Urgent Care locations:  [facility] Monday-Friday   10 am - 8 pm  Saturday and Sunday   9 am - 5 pm [phone]   **Surgeries** Our Surgery Schedulers will contact you to schedule. If you do not receive a call within 3 business days, please call [phone].    [facility] Labor and Delivery:  [phone]    If you need a medication refill, please contact your pharmacy. Please allow 3 business days for your refill to be completed.  As always, Thank you for trusting us with your healthcare needs!  see additional instructions from your care team below

## 2022-08-10 ENCOUNTER — PRENATAL OFFICE VISIT (OUTPATIENT)
Dept: OBGYN | Facility: CLINIC | Age: 27
End: 2022-08-10
Payer: COMMERCIAL

## 2022-08-10 VITALS
SYSTOLIC BLOOD PRESSURE: 123 MMHG | BODY MASS INDEX: 29.09 KG/M2 | WEIGHT: 148.2 LBS | HEART RATE: 86 BPM | OXYGEN SATURATION: 98 % | DIASTOLIC BLOOD PRESSURE: 83 MMHG | HEIGHT: 60 IN

## 2022-08-10 DIAGNOSIS — Z34.83 ENCOUNTER FOR SUPERVISION OF OTHER NORMAL PREGNANCY, THIRD TRIMESTER: Primary | ICD-10-CM

## 2022-08-10 PROCEDURE — 99207 PR PRENATAL VISIT: CPT | Performed by: NURSE PRACTITIONER

## 2022-08-10 PROCEDURE — 87653 STREP B DNA AMP PROBE: CPT | Performed by: NURSE PRACTITIONER

## 2022-08-10 ASSESSMENT — PAIN SCALES - GENERAL: PAINLEVEL: NO PAIN (0)

## 2022-08-10 NOTE — PROGRESS NOTES
Patient presents for routine prenatal visit. Prenatal flowsheet reviewed and updated as needed.  Denies vaginal bleeding, loss of fluid, contractions or cramping. Denies headache, nausea/vomiting, upper abdominal pain, vision changes, lower extremity swelling, chest pain or shortness of breath.     Anticipatory guidance appropriate for gestational age reviewed.  PE: See OB vitals    Pregnancy complicated by:  Anemia-continue oral iron    Routine prenatal care:  Reviewed signs and symptoms of labor and when to proceed to L&D.  GBS today    Questions asked and answered. Next OB visit in 1 week(s) with OB Physician.    Iris ALCARAZ CNP

## 2022-08-10 NOTE — PATIENT INSTRUCTIONS
If you have any questions regarding your visit, Please contact your care team.     [employer] [employer] [facility] [employer]: [phone]  To Schedule an Appointment 24/7  Call: [phone]. Cloud Hospital HOURS TELEPHONE NUMBER     Iris Vargas- APRN CNP      Ashley Patrick-DANA Burnett-DANA Branham-Surgery Scheduler  Katie-Surgery Scheduler         Monday 7:30 am-5:00 pm    Tuesday 8:00 am-4:00 pm    Wednesday 7:30 am-4:00 pm  [place]    Thursday 8:00 am-11:00 am    Friday 7:30 am-4:00 pm [place] [address] [place] [address] [phone] ask for [facility]  [phone] Fax    Imaging Scheduling all locations  [phone]     [facility] Labor and Delivery  [address] [place] [address] Dr.  [place], [place] [id number] [phone]         Urgent Care locations:  [facility]   Monday-Friday  10 am - 8 pm  Saturday and Sunday   9 am - 5 pm     [phone]   If you need a medication refill, please contact your pharmacy. Please allow 3 business days for your refill to be completed.  As always, Thank you for trusting us with your healthcare needs!      see additional instructions from your care team below

## 2022-08-11 LAB — GP B STREP DNA SPEC QL NAA+PROBE: NEGATIVE

## 2022-08-16 ENCOUNTER — NURSE TRIAGE (OUTPATIENT)
Dept: NURSING | Facility: CLINIC | Age: 27
End: 2022-08-16

## 2022-08-16 ENCOUNTER — HOSPITAL ENCOUNTER (OUTPATIENT)
Facility: CLINIC | Age: 27
Discharge: HOME | End: 2022-08-16
Attending: OBSTETRICS & GYNECOLOGY | Admitting: OBSTETRICS & GYNECOLOGY
Payer: COMMERCIAL

## 2022-08-16 ENCOUNTER — HOSPITAL ENCOUNTER (INPATIENT)
Facility: CLINIC | Age: 27
End: 2022-08-16
Admitting: OBSTETRICS & GYNECOLOGY
Payer: COMMERCIAL

## 2022-08-16 VITALS — TEMPERATURE: 97.6 F | DIASTOLIC BLOOD PRESSURE: 72 MMHG | RESPIRATION RATE: 16 BRPM | SYSTOLIC BLOOD PRESSURE: 110 MMHG

## 2022-08-16 DIAGNOSIS — Z34.80 SUPERVISION OF OTHER NORMAL PREGNANCY, ANTEPARTUM: Primary | ICD-10-CM

## 2022-08-16 PROBLEM — Z37.9 NORMAL LABOR: Status: ACTIVE | Noted: 2022-08-16

## 2022-08-16 LAB
ABO/RH(D): NORMAL
ALBUMIN UR-MCNC: NEGATIVE MG/DL
AMPHETAMINES UR QL SCN: NORMAL
ANTIBODY SCREEN: NEGATIVE
APPEARANCE UR: CLEAR
BACTERIA #/AREA URNS HPF: ABNORMAL /HPF
BILIRUB UR QL STRIP: NEGATIVE
CANNABINOIDS UR QL SCN: NORMAL
COCAINE UR QL: NORMAL
COLOR UR AUTO: ABNORMAL
ERYTHROCYTE [DISTWIDTH] IN BLOOD BY AUTOMATED COUNT: 15.8 % (ref 10–15)
GLUCOSE UR STRIP-MCNC: NEGATIVE MG/DL
HCT VFR BLD AUTO: 37.1 % (ref 35–47)
HGB BLD-MCNC: 11.1 G/DL (ref 11.7–15.7)
HGB UR QL STRIP: NEGATIVE
KETONES UR STRIP-MCNC: NEGATIVE MG/DL
LEUKOCYTE ESTERASE UR QL STRIP: ABNORMAL
MCH RBC QN AUTO: 24.2 PG (ref 26.5–33)
MCHC RBC AUTO-ENTMCNC: 29.9 G/DL (ref 31.5–36.5)
MCV RBC AUTO: 81 FL (ref 78–100)
MUCOUS THREADS #/AREA URNS LPF: PRESENT /LPF
NITRATE UR QL: NEGATIVE
OPIATES UR QL SCN: NORMAL
PCP UR QL SCN: NORMAL
PH UR STRIP: 6.5 [PH] (ref 5–7)
PLATELET # BLD AUTO: 222 10E3/UL (ref 150–450)
RBC # BLD AUTO: 4.59 10E6/UL (ref 3.8–5.2)
RBC URINE: 1 /HPF
SARS-COV-2 RNA RESP QL NAA+PROBE: NEGATIVE
SP GR UR STRIP: 1.01 (ref 1–1.03)
SPECIMEN EXPIRATION DATE: NORMAL
SQUAMOUS EPITHELIAL: 1 /HPF
T PALLIDUM AB SER QL: NONREACTIVE
UROBILINOGEN UR STRIP-MCNC: NORMAL MG/DL
WBC # BLD AUTO: 7.3 10E3/UL (ref 4–11)
WBC URINE: <1 /HPF

## 2022-08-16 PROCEDURE — 86901 BLOOD TYPING SEROLOGIC RH(D): CPT

## 2022-08-16 PROCEDURE — 86780 TREPONEMA PALLIDUM: CPT

## 2022-08-16 PROCEDURE — 80307 DRUG TEST PRSMV CHEM ANLYZR: CPT

## 2022-08-16 PROCEDURE — 81001 URINALYSIS AUTO W/SCOPE: CPT | Mod: XU

## 2022-08-16 PROCEDURE — G0463 HOSPITAL OUTPT CLINIC VISIT: HCPCS | Mod: 25

## 2022-08-16 PROCEDURE — C9803 HOPD COVID-19 SPEC COLLECT: HCPCS

## 2022-08-16 PROCEDURE — 120N000002 HC R&B MED SURG/OB UMMC

## 2022-08-16 PROCEDURE — U0005 INFEC AGEN DETEC AMPLI PROBE: HCPCS

## 2022-08-16 PROCEDURE — 85027 COMPLETE CBC AUTOMATED: CPT

## 2022-08-16 RX ORDER — OXYTOCIN 10 [USP'U]/ML
10 INJECTION, SOLUTION INTRAMUSCULAR; INTRAVENOUS
Status: DISCONTINUED | OUTPATIENT
Start: 2022-08-16 | End: 2022-08-16 | Stop reason: HOSPADM

## 2022-08-16 RX ORDER — PROCHLORPERAZINE MALEATE 10 MG
10 TABLET ORAL EVERY 6 HOURS PRN
Status: DISCONTINUED | OUTPATIENT
Start: 2022-08-16 | End: 2022-08-16 | Stop reason: HOSPADM

## 2022-08-16 RX ORDER — METOCLOPRAMIDE HYDROCHLORIDE 5 MG/ML
10 INJECTION INTRAMUSCULAR; INTRAVENOUS EVERY 6 HOURS PRN
Status: DISCONTINUED | OUTPATIENT
Start: 2022-08-16 | End: 2022-08-16 | Stop reason: HOSPADM

## 2022-08-16 RX ORDER — MISOPROSTOL 200 UG/1
800 TABLET ORAL
Status: DISCONTINUED | OUTPATIENT
Start: 2022-08-16 | End: 2022-08-16 | Stop reason: HOSPADM

## 2022-08-16 RX ORDER — ACETAMINOPHEN 325 MG/1
650 TABLET ORAL EVERY 4 HOURS PRN
Status: DISCONTINUED | OUTPATIENT
Start: 2022-08-16 | End: 2022-08-16 | Stop reason: HOSPADM

## 2022-08-16 RX ORDER — PROCHLORPERAZINE 25 MG
25 SUPPOSITORY, RECTAL RECTAL EVERY 12 HOURS PRN
Status: DISCONTINUED | OUTPATIENT
Start: 2022-08-16 | End: 2022-08-16 | Stop reason: HOSPADM

## 2022-08-16 RX ORDER — METHYLERGONOVINE MALEATE 0.2 MG/ML
200 INJECTION INTRAVENOUS
Status: DISCONTINUED | OUTPATIENT
Start: 2022-08-16 | End: 2022-08-16 | Stop reason: HOSPADM

## 2022-08-16 RX ORDER — CITRIC ACID/SODIUM CITRATE 334-500MG
30 SOLUTION, ORAL ORAL
Status: DISCONTINUED | OUTPATIENT
Start: 2022-08-16 | End: 2022-08-16 | Stop reason: HOSPADM

## 2022-08-16 RX ORDER — CARBOPROST TROMETHAMINE 250 UG/ML
250 INJECTION, SOLUTION INTRAMUSCULAR
Status: DISCONTINUED | OUTPATIENT
Start: 2022-08-16 | End: 2022-08-16 | Stop reason: HOSPADM

## 2022-08-16 RX ORDER — HYDROXYZINE HYDROCHLORIDE 50 MG/1
50 TABLET, FILM COATED ORAL EVERY 6 HOURS PRN
Status: DISCONTINUED | OUTPATIENT
Start: 2022-08-16 | End: 2022-08-16 | Stop reason: HOSPADM

## 2022-08-16 RX ORDER — IBUPROFEN 800 MG/1
800 TABLET, FILM COATED ORAL
Status: DISCONTINUED | OUTPATIENT
Start: 2022-08-16 | End: 2022-08-16 | Stop reason: HOSPADM

## 2022-08-16 RX ORDER — KETOROLAC TROMETHAMINE 30 MG/ML
30 INJECTION, SOLUTION INTRAMUSCULAR; INTRAVENOUS
Status: DISCONTINUED | OUTPATIENT
Start: 2022-08-16 | End: 2022-08-16 | Stop reason: HOSPADM

## 2022-08-16 RX ORDER — NALOXONE HYDROCHLORIDE 0.4 MG/ML
0.2 INJECTION, SOLUTION INTRAMUSCULAR; INTRAVENOUS; SUBCUTANEOUS
Status: DISCONTINUED | OUTPATIENT
Start: 2022-08-16 | End: 2022-08-16 | Stop reason: HOSPADM

## 2022-08-16 RX ORDER — FENTANYL CITRATE 50 UG/ML
100 INJECTION, SOLUTION INTRAMUSCULAR; INTRAVENOUS
Status: DISCONTINUED | OUTPATIENT
Start: 2022-08-16 | End: 2022-08-16 | Stop reason: HOSPADM

## 2022-08-16 RX ORDER — HYDROXYZINE HYDROCHLORIDE 25 MG/1
25 TABLET, FILM COATED ORAL EVERY 6 HOURS PRN
Status: DISCONTINUED | OUTPATIENT
Start: 2022-08-16 | End: 2022-08-16 | Stop reason: HOSPADM

## 2022-08-16 RX ORDER — MISOPROSTOL 200 UG/1
400 TABLET ORAL
Status: DISCONTINUED | OUTPATIENT
Start: 2022-08-16 | End: 2022-08-16 | Stop reason: HOSPADM

## 2022-08-16 RX ORDER — NALOXONE HYDROCHLORIDE 0.4 MG/ML
0.4 INJECTION, SOLUTION INTRAMUSCULAR; INTRAVENOUS; SUBCUTANEOUS
Status: DISCONTINUED | OUTPATIENT
Start: 2022-08-16 | End: 2022-08-16 | Stop reason: HOSPADM

## 2022-08-16 RX ORDER — ONDANSETRON 4 MG/1
4 TABLET, ORALLY DISINTEGRATING ORAL EVERY 6 HOURS PRN
Status: DISCONTINUED | OUTPATIENT
Start: 2022-08-16 | End: 2022-08-16 | Stop reason: HOSPADM

## 2022-08-16 RX ORDER — METOCLOPRAMIDE 10 MG/1
10 TABLET ORAL EVERY 6 HOURS PRN
Status: DISCONTINUED | OUTPATIENT
Start: 2022-08-16 | End: 2022-08-16 | Stop reason: HOSPADM

## 2022-08-16 RX ORDER — TRANEXAMIC ACID 10 MG/ML
1 INJECTION, SOLUTION INTRAVENOUS EVERY 30 MIN PRN
Status: DISCONTINUED | OUTPATIENT
Start: 2022-08-16 | End: 2022-08-16 | Stop reason: HOSPADM

## 2022-08-16 RX ORDER — OXYTOCIN/0.9 % SODIUM CHLORIDE 30/500 ML
340 PLASTIC BAG, INJECTION (ML) INTRAVENOUS CONTINUOUS PRN
Status: DISCONTINUED | OUTPATIENT
Start: 2022-08-16 | End: 2022-08-16 | Stop reason: HOSPADM

## 2022-08-16 RX ORDER — OXYTOCIN/0.9 % SODIUM CHLORIDE 30/500 ML
100-340 PLASTIC BAG, INJECTION (ML) INTRAVENOUS CONTINUOUS PRN
Status: DISCONTINUED | OUTPATIENT
Start: 2022-08-16 | End: 2022-08-16 | Stop reason: HOSPADM

## 2022-08-16 RX ORDER — HYDROXYZINE PAMOATE 25 MG/1
25-50 CAPSULE ORAL 3 TIMES DAILY PRN
Qty: 30 CAPSULE | Refills: 1 | Status: SHIPPED | OUTPATIENT
Start: 2022-08-16 | End: 2022-10-12

## 2022-08-16 RX ORDER — ONDANSETRON 2 MG/ML
4 INJECTION INTRAMUSCULAR; INTRAVENOUS EVERY 6 HOURS PRN
Status: DISCONTINUED | OUTPATIENT
Start: 2022-08-16 | End: 2022-08-16 | Stop reason: HOSPADM

## 2022-08-16 ASSESSMENT — ACTIVITIES OF DAILY LIVING (ADL)
ADLS_ACUITY_SCORE: 18

## 2022-08-16 NOTE — DISCHARGE SUMMARY
[facility] Discharge Summary    Dennies Paye MRN# [id number]   Age: 26 year old YOB: 1995     Date of Admission:  2022  Date of Discharge:  2022  Admitting Physician:  Mouna Emerson MD  Discharge Physician:  Jenny Rust MD     Admission Diagnosis:  - IUP at 37w2d  - Uterine contractions- likely latent labor  - Anemia - last Hgb 10.9    Discharge Diagnosis:  - Same as above  - Early latent labor    Procedures:  None    Consultations:  None    Medications prior to admission:  Medications Prior to Admission   Medication Sig Dispense Refill Last Dose     ferrous sulfate (FEROSUL) 325 (65 Fe) MG tablet Take 325 mg by mouth daily (with breakfast)   8/15/2022 at Unknown time     polyethylene glycol (MIRALAX) 17 GM/Dose powder Take 17 g by mouth as needed   Unknown at Unknown time     Prenatal Vit-Fe Fumarate-FA (PRENATAL MULTIVITAMIN W/IRON) 27-0.8 MG tablet Take 1 tablet by mouth daily 90 tablet 3 8/15/2022 at Unknown time     pyridOXINE (VITAMIN B6) 25 MG tablet Take 25 mg by mouth daily   Unknown at Unknown time         Brief History of Presentation:    Dennies Paye is a 26 year old  at 37w2d by 6w2d US who presented with increasingly painful and frequent uterine contractions. She was diverted from [place] to [facility].    Please see H&P for additional details.    Hospital Course:    With initial fluid administration, contractions spaced out (from 5 to 3 in ten minutes) and became less intense/more manageable for the patient. She declined PO medications and further IVF for her discomfort during this admission. Cervical exam was stable over 10+ hours at 1-2/70/-3, and bulging bag of water initially felt at the time of admission was no longer present. At this point, discharge home in the setting of early latent labor prior to 39 weeks gestation was recommended. Vistaril provided to promote rest and comfort at home.    Discharge  Instructions:  Call or present to labor and delivery if you experience:   -Regular painful contractions concerning for labor   -Leakage of fluid concerning for ruptured membranes   -Decreased fetal movement   -Bright red vaginal bleeding    -Headache, vision changes, upper abdominal pain, significant increase in swelling,   generalized unwell feeling    Follow up:  Follow up with primary OB at [facility] within 1 week (patient states that she has an appointment tomorrow).      Discharge Medications:  Current Discharge Medication List      START taking these medications    Details   hydrOXYzine (VISTARIL) 25 MG capsule Take 1-2 capsules (25-50 mg) by mouth 3 times daily as needed for other (discomfort, sleep)  Qty: 30 capsule, Refills: 1    Associated Diagnoses: Supervision of other normal pregnancy, antepartum         CONTINUE these medications which have NOT CHANGED    Details   ferrous sulfate (FEROSUL) 325 (65 Fe) MG tablet Take 325 mg by mouth daily (with breakfast)      polyethylene glycol (MIRALAX) 17 GM/Dose powder Take 17 g by mouth as needed      Prenatal Vit-Fe Fumarate-FA (PRENATAL MULTIVITAMIN W/IRON) 27-0.8 MG tablet Take 1 tablet by mouth daily  Qty: 90 tablet, Refills: 3    Associated Diagnoses: Pregnant state, incidental      pyridOXINE (VITAMIN B6) 25 MG tablet Take 25 mg by mouth daily             Gaby Foss MD, PGY-2  2:35 PM  [facility] Obstetrics & Gynecology Residency    [facility] staff:  Appreciate note by Dr. Foss.  I have seen and examined the patient without the resident. I have reviewed, edited, and agree with the note.      Jenny Rust MD, FACOG  8/16/2022  4:09 PM

## 2022-08-16 NOTE — PLAN OF CARE
Goal Outcome Evaluation:     Pt is tachycardiac, other VSS. Pt is reporting painful contractions. Contractions are occurring q2-4 min. Pain has been managed with heat packs, birthing ball, and breathing techniques; pain interventions are effective per patient report. Ambulated around halls. Pt is considering having an epidural. Pt requested to see a ; order for spiritual services consult was placed. EFM strip is category 1. IV is saline locked. Sister, Татьяна, is at bedside. Anticipate . Continue with plan of care.

## 2022-08-16 NOTE — DISCHARGE INSTRUCTIONS
Discharge Instruction for Undelivered Patients      You were seen for: Labor Assessment  We Consulted: [facility] MDs  You had (Test or Medicine): monitoring     Diet:   Drink 8 to 12 glasses of liquids (milk, juice, water) every day.  You may eat meals and snacks.     Activity:  Call your doctor or nurse midwife if your baby is moving less than usual.     Call your provider if you notice:  Swelling in your face or increased swelling in your hands or legs.  Headaches that are not relieved by Tylenol (acetaminophen).  Changes in your vision (blurring: seeing spots or stars.)  Nausea (sick to your stomach) and vomiting (throwing up).   Weight gain of 5 pounds or more per week.  Heartburn that doesn't go away.  Signs of bladder infection: pain when you urinate (use the toilet), need to go more often and more urgently.  The bag of rodgers (rupture of membranes) breaks, or you notice leaking in your underwear.  Bright red blood in your underwear.  Abdominal (lower belly) or stomach pain.  For first baby: Contractions (tightening) less than 5 minutes apart for one hour or more.  Second (plus) baby: Contractions (tightening) less than 10 minutes apart and getting stronger.  *If less than 34 weeks: Contractions (tightening) more than 6 times in one hour.  Increase or change in vaginal discharge (note the color and amount)  Other:     Follow-up:  As scheduled in the clinic

## 2022-08-16 NOTE — PROGRESS NOTES
[facility]  Labor Progress Note    S:  Contractions continue to come and go, patient requesting repeat cervical exam. Prefers PO hydration over IVF bolus offered. Amenable to trying Vistaril for discharge. Has regular OB visit tomorrow and feels comfortable discharging now and following up then. Reviewed return precautions.    O:   Patient Vitals for the past 4 hrs:   BP Temp Temp src Resp   22 1103 110/72 97.6  F (36.4  C) Oral 16     SVE: /-3, unchanged from previous exam, no BBOW felt      A/P:  Dennies Paye is a 26 year old  at 37w2d here with painful contractions. Pregnancy complicated by mild anemia.    # Early Latent Labor:   - SVE: Unchanged at /-3 over 10+ hours  - Membranes: Intact  - Pain: Patient continues to decline pain management, IVF.  - Plan: Recommend discharge home given stable cervical exam and still intermittent contraction frequency. Patient feels ok with this plan. Follow up in primary OB clinic tomorrow.    PNC: Rh pos, Rubella immune, GBS neg, GCT wnl, infectious labs wnl    Patient discussed with Dr. Rust.    Gaby Foss MD, PGY-2  2:26 PM  [facility] Obstetrics & Gynecology Residency

## 2022-08-16 NOTE — PLAN OF CARE
Goal Outcome Evaluation:    Plan of Care Reviewed With: patient, sibling     Data: [facility].   Reason for maternal/fetal assessment per patient is Laboring  . Patient is a . Prenatal record reviewed.      OB History    Para Term  AB Living   3 0 0 0 2 0   SAB IAB Ectopic Multiple Live Births   1 1 0 0 0      # Outcome Date GA Lbr Wyatt/2nd Weight Sex Delivery Anes PTL Lv   3 Current            2 SAB      SAB      1 IAB      IAB         Medical History:   Past Medical History:   Diagnosis Date    Known health problems: none    . Gestational Age 37w2d. VSS. Cervix: dilated to 1.  Fetal movement present. Patient denies backache, vaginal discharge, pelvic pressure, UTI symptoms, GI problems, bloody show, vaginal bleeding, edema, headache, visual disturbances, epigastric or URQ pain, abdominal pain, rupture of membranes. Support persons sister present.  Action: Verbal consent for EFM. Triage assessment completed. EFM applied for fetal well being. Uterine assessment shows contractions every 2-7 minutes. Fetal assessment: Presumed adequate fetal oxygenation documented (see flow record). Patient education pamphlets given on discharge instructions and follow up plan, patient reports she has a clinic appointment for tomorrow. Patient instructed to report change in fetal movement, vaginal leaking of fluid or bleeding, abdominal pain, or any concerns related to the pregnancy to her nurse/physician.   Response: Dr. Foss and Dr Barrera segal for patient to discharge to home, see provider's notes. Patient verbalized understanding of education and verbalized agreement with plan. Discharged ambulatory at 1440.

## 2022-08-16 NOTE — PROGRESS NOTES
[facility]  Labor Progress Note    S:  Patient still very uncomfortable with contractions.     O:   Patient Vitals for the past 4 hrs:   BP Temp Temp src Resp   22 0334 129/84 98.3  F (36.8  C) Oral 16     SVE: 70/-3, unchanged from previous exam with BBOW    FHT: Baseline 145, minimal to moderate variability, no accelerations, absent decelerations  [place]: 4 contractions in 10 minutes    A/P:  Dennies Paye is a 26 year old  at 37w2d here with painful contractions. Pregnancy complicated by mild anemia. Cervical progressing as expected.    # Early Latent Labor:   - SVE: Unchanged at /-3 with BBOW  - Membranes: Intact, BBOW   - Pain: Patient initially declined pain management. Will discuss morphine/vistaril  - Plan: Continue to monitor for cervical change. Encourage ambulation. Consider AROM when more dilated    FWB:  Category II, reactive and reassuring FHT  PNC: Rh pos, Rubella immune, GBS neg, GCT wnl, infectious labs wnl    Mariaelena Ritter MD  OB/GYN Resident, PGY-2

## 2022-08-16 NOTE — TELEPHONE ENCOUNTER
"OB Triage Call      Is patient's OB/Midwife with the formerly [facility] or [facility]? [facility]- Proceed with triage     Reason for call:   Laboring     Assessment:  Contractions started at 9pm tonight and are \"back to back\" this is patient second child, she is currently 37 weeks and 2 days. Denies high risk pregnancy.     Plan: Go to L&D    Patient plans to deliver at [place] [facility]    Patient's primary OB Provider is [facility] .      Per protocol recommendations Patient to be evaluated in L&D. Patient's primary OB is [facility].  Labor and delivery at [facility] ([phone]) notified of patient's pending arrival.  Report given to Dede .    Is patient's delivering hospital on divert? Yes- If patient's hospital of choice is on divert, RN to page on-call provider for next steps and advise that L&D is full and to please advise. Per provider patient to go to Delivering Hospital: [facility] ([phone]).  L&D notified of patient's pending arrival per Dr. Burns.      37w2d    Estimated Date of Delivery: Sep 4, 2022        OB History    Para Term  AB Living   3 0 0 0 2 0   SAB IAB Ectopic Multiple Live Births   1 1 0 0 0      # Outcome Date GA Lbr Wyatt/2nd Weight Sex Delivery Anes PTL Lv   3 Current            2 SAB      SAB      1 IAB      IAB          No results found for: GBS       Nick Lilly RN 22 2:37 AM  [facility] Nurse Advisor          Reason for Disposition    [1] History of prior delivery (multipara) AND [2] contractions < 10 minutes apart AND [3] present 1 hour    Additional Information    Negative: Passed out (i.e., lost consciousness, collapsed and was not responding)    Negative: Shock suspected (e.g., cold/pale/clammy skin, too weak to stand, low BP, rapid pulse)    Negative: Difficult to awaken or acting confused (e.g., disoriented, slurred speech)    Negative: [1] SEVERE abdominal pain (e.g., excruciating) AND [2] constant AND [3] present > 1 hour    Negative: " "Severe bleeding (e.g., continuous red blood from vagina, or large blood clots)    Negative: Umbilical cord hanging out of the vagina (shiny, white, curled appearance, \"like telephone cord\")    Negative: Uncontrollable urge to push (i.e., feels like baby is coming out now)    Negative: Can see baby    Negative: Sounds like a life-threatening emergency to the triager    Negative: [1] First baby (primipara) AND [2] contractions < 6 minutes apart  AND [3] present 2 hours    Protocols used: PREGNANCY - LABOR-A-AH      "

## 2022-08-16 NOTE — PROVIDER NOTIFICATION
08/16/22 1156   Provider Notification   Provider Name/Title Dr Foss   Method of Notification In Department   Notification Reason Status Update  (provider okayed pt to be removed from monitor)

## 2022-08-16 NOTE — PLAN OF CARE
Data: Patient presented to [facility] at to rule out labor.   Reason for maternal/fetal assessment per patient is Laboring  .  Patient is a . Prenatal record reviewed.      OB History    Para Term  AB Living   3 0 0 0 2 0   SAB IAB Ectopic Multiple Live Births   1 1 0 0 0      # Outcome Date GA Lbr Wyatt/2nd Weight Sex Delivery Anes PTL Lv   3 Current            2 SAB      SAB      1 IAB      IAB      . Medical history:   Past Medical History:   Diagnosis Date     Known health problems: none    . Gestational Age 37w2d. VSS. Fetal movement present. Patient denies vaginal discharge, UTI symptoms, GI problems, bloody show, vaginal bleeding, headache, visual disturbances, epigastric or URQ pain, and rupture of membranes. Patient reports pain with contractions. Support persons,sister, present.  Action: Verbal consent for EFM. Admission assessment completed. EFM applied to assess fetal heart rate. Fetal assessment: Presumed adequate fetal oxygenation documented (see flow record). [place] applied for uterine assessment. Patient is cora q2-4 minutes.   Response:  informed of patient arrival.  completed SVE- results were /-3. Plan per provider is to continue to monitor patient through labor. Patient verbalized agreement with plan. Patient admitted to room 0475 ambulatory and was oriented to room and call light.

## 2022-08-16 NOTE — PROGRESS NOTES
[facility]  Labor Progress Note    S:  Patient still uncomfortable with contractions but agrees that they have spaced out and are a bit more manageable. Has found heat therapy helpful. Still worried about managing pain at home. She is not interested in trying PO pain medications. Amenable to repeat cervical exam.    After SVE discussed that she is likely in very early latent labor, and it may be many hours or days until cervical change progresses. Also talked about inability to offer induction/augmentation at this gestational age with a medical indication or fetal status concern. Patient shared worries about not being able to make it back to the hospital in time for delivery if she goes home. Provided reassurance that this is unlikely given very early labor in this essential prime patient and discussed normal rate of labor progression. Encouraged her to return with any concerns should she discharge home today.    O:   Patient Vitals for the past 4 hrs:   BP Temp Temp src Resp   22 1103 110/72 97.6  F (36.4  C) Oral 16     SVE: 1/70/-3, unchanged from previous exam, no BBOW felt    FHT: Baseline 135, moderate variability, no accelerations, absent decelerations  [place]: 2-3 contractions in 10 minutes    A/P:  Dennies Paye is a 26 year old  at 37w2d here with painful contractions. Pregnancy complicated by mild anemia.     # Early Latent Labor:   - SVE: Unchanged at 1/70/-3 over 8 hours  - Membranes: Intact  - Pain: Patient continues to decline pain management.  - Plan: Recommend discharge home given stable cervical exam and decreased contraction frequency. Patient to consider her comfort level with this plan.    FWB:  Category I, reactive and reassuring FHT  PNC: Rh pos, Rubella immune, GBS neg, GCT wnl, infectious labs wnl    Patient discussed with Dr. Rust.    Gaby Foss MD, PGY-2  11:53 AM  [facility] Obstetrics & Gynecology Residency

## 2022-08-16 NOTE — H&P
[facility]  Labor & Delivery History and Physical   2022  Dennies Paye  [id number]      HPI:   Dennies Paye is a 26 year old  at 37w2d by 6w2d US who presents in spontaneous labor. Pregnancy complicated as below.    She is a JIMENA from [place] as they are on divert. Patient reports contractions started at 11pm tonight. Denies leakage of fluids or vaginal bleeding. Good fetal movement. She denies fever, chills headache, vision changes, loss of taste/smell, cough, sore throat, chest pain, shortness of breath, RUQ pain, nausea, vomiting, dysuria, constipation, diarrhea, increased edema.    Pregnancy complicated by:  - Anemia  - History of SAB X2    OBHX:   OB History    Para Term  AB Living   3 0 0 0 2 0   SAB IAB Ectopic Multiple Live Births   1 1 0 0 0      # Outcome Date GA Lbr Wyatt/2nd Weight Sex Delivery Anes PTL Lv   3 Current            2 SAB      SAB      1 IAB      IAB          Past Medical History:   Diagnosis Date     Known health problems: none        Past Surgical History:   Procedure Laterality Date     HC INSERTION INTRAUTERINE DEVICE       HC REMOVE INTRAUTERINE DEVICE         Medications:   No current facility-administered medications on file prior to encounter.  ferrous sulfate (FEROSUL) 325 (65 Fe) MG tablet, Take 325 mg by mouth daily (with breakfast)  polyethylene glycol (MIRALAX) 17 GM/Dose powder, Take 17 g by mouth as needed  Prenatal Vit-Fe Fumarate-FA (PRENATAL MULTIVITAMIN W/IRON) 27-0.8 MG tablet, Take 1 tablet by mouth daily  pyridOXINE (VITAMIN B6) 25 MG tablet, Take 25 mg by mouth daily        Allergies   Allergen Reactions     Nuts Anaphylaxis and Hives     Peanut allergy       Family History   Problem Relation Age of Onset     No Known Problems Mother      No Known Problems Father      No Known Problems Sister      No Known Problems Brother        SocialHX:   Social History     Tobacco Use     Smoking status: Never Smoker      Smokeless tobacco: Never Used   Vaping Use     Vaping Use: Never used   Substance Use Topics     Alcohol use: No     Drug use: No       ROS: 10-point ROS negative except as indicated in HPI.    Physical Exam:  Vitals:    08/16/22 0334   BP: 129/84   BP Location: Right arm   Cuff Size: Adult Regular   Resp: 16   Temp: 98.3  F (36.8  C)   TempSrc: Oral     General: alert, oriented female, resting in bed in NAD  CV: Well perfused   Lungs: clear bilaterally, no crackles or wheezes.   Abdomen: soft, gravid, non-tender, EFW 6.5#.  Extremities: bilateral lower extremities non-tender with trace edema    SVE: 1/70/-3  Membranes: intact    Presentation: cephalic by BSUS    FHT  Baseline: 145  Variability: Moderate to minimal  Accels: Present  Decels: possible intermittent variable decels  [place]: 4 contractions in 10 minutes    Prenatal ultrasounds:  4/5/22  BIOMETRY:  Biparietal Diameter: 4.2 cm, 69%  Head Circumference: 15.5 cm, 51%  Abdominal Circumference: 14.1 cm, 84%  Femur Length: 3.0 cm, 78%     Estimated Fetal Weight: 282 g  EFW Percentile: 94%     Composite Age by This US: 19 weeks 1 day  Composite Age by First US: 18 weeks 2 days     Prenatal Labs:   Lab Results   Component Value Date    AS Negative 01/20/2022    HEPBANG Nonreactive 01/20/2022    CHPCRT  05/09/2016     Negative   Negative for C. trachomatis rRNA by transcription mediated amplification.   A negative result by transcription mediated amplification does not preclude the   presence of C. trachomatis infection because results are dependent on proper   and adequate collection, absence of inhibitors, and sufficient rRNA to be   detected.      GCPCRT Negative 09/18/2018    HGB 10.9 (L) 07/13/2022       GBS Status:   GBS negative       Labs:   Results for orders placed or performed during the hospital encounter of 08/16/22 (from the past 24 hour(s))   ABO/Rh type and screen    Narrative    The following orders were created for panel order ABO/Rh type and  screen.  Procedure                               Abnormality         Status                     ---------                               -----------         ------                     Adult Type and Screen[[id number]]                                                         Please view results for these tests on the individual orders.       A/P:   26 year old  at 37w2d by 6w2d who presents in spontaneous labor. Pregnancy complicated as below:    # Spontaneous Labor  - Admit to labor and delivery   - Observe for spontaneous progress, consider pitocin for augmentation   - SVE prn; Cervical exam on admission: /-3  - Membranes: intact  - Labs: CBC, T&S, RPR  - GBS negative  - Pain Control: Per patient request; Discussed options    - Vistaril and IM Morphine for therapeutic sleep   - Desires no medication in active labor.  - Diet: Regular   - FWB: Cat  tracing, II; Cephalic by BSUS; EFW 6.5#   - Continuous Fetal Monitoring; can consider IA if tracing reactive and reassuring  - PPH Prophylaxis    #PNC:     - BMI 29  - Rh positive, Rubella immune, GCT wnl  - Hep B Antigen neg  - GBS neg  - Other infectious labs neg  - Placenta: Anterior   - Immunization: s/p TDAP      Patient seen and care plan discussed under supervision of Dr. Idania Ritter MD  OB/GYN Resident, PGY-2    [facility] staff:  Appreciate note by Dr. Ritter.  I have seen and examined the patient without the resident. I have reviewed, edited, and agree with the note.      Jenny Rust MD, FACOG  2022  11:00 AM

## 2022-08-17 ENCOUNTER — PRENATAL OFFICE VISIT (OUTPATIENT)
Dept: OBGYN | Facility: CLINIC | Age: 27
End: 2022-08-17
Payer: COMMERCIAL

## 2022-08-17 VITALS
HEIGHT: 60 IN | WEIGHT: 150.2 LBS | SYSTOLIC BLOOD PRESSURE: 135 MMHG | DIASTOLIC BLOOD PRESSURE: 87 MMHG | BODY MASS INDEX: 29.49 KG/M2 | HEART RATE: 109 BPM | OXYGEN SATURATION: 97 %

## 2022-08-17 DIAGNOSIS — Z34.80 SUPERVISION OF OTHER NORMAL PREGNANCY, ANTEPARTUM: Primary | ICD-10-CM

## 2022-08-17 PROCEDURE — 99207 PR PRENATAL VISIT: CPT | Performed by: NURSE PRACTITIONER

## 2022-08-17 ASSESSMENT — PAIN SCALES - GENERAL: PAINLEVEL: MILD PAIN (3)

## 2022-08-17 NOTE — PROGRESS NOTES
Patient presents for routine prenatal visit. Prenatal flowsheet reviewed and updated as needed.  Denies vaginal bleeding, loss of fluid. Denies headache, nausea/vomiting, upper abdominal pain, vision changes, lower extremity swelling, chest pain or shortness of breath.     Anticipatory guidance appropriate for gestational age reviewed.  PE: See OB vitals    Pregnancy complicated by:  Anemia-on iron  Patient seen in L&D at [place] yesterday-early latent labor-contractions have stopped today.    Routine prenatal care:  Reviewed signs and symptoms of labor and when to proceed to L&D.    Questions asked and answered. Next OB visit in 1 week(s) with OB Physician.    Iris ALCARAZ CNP

## 2022-08-17 NOTE — PATIENT INSTRUCTIONS
If you have any questions regarding your visit, Please contact your care team.     [employer] [employer] [facility]: [phone]  To Schedule an Appointment 24/7  Call: [phone] HOURS TELEPHONE NUMBER     Iris Vargas- APRN CNP      Ashley Patrick-DANA Burnett-DANA Branham-Surgery Scheduler  Katie-Surgery Scheduler         Monday 7:30 am-5:00 pm    Tuesday 8:00 am-4:00 pm    Wednesday 7:30 am-4:00 pm  [place]    Thursday 8:00 am-11:00 am    Friday 7:30 am-4:00 pm [place] [address] [place] [address] [phone] ask for [facility]  [phone] Fax    Imaging Scheduling all locations  [phone]     [facility] Labor and Delivery  [address] [place] [address] Dr.  [place], [place] [id number] [phone]         Urgent Care locations:  [facility]   Monday-Friday  10 am - 8 pm  Saturday and Sunday   9 am - 5 pm     [phone]   If you need a medication refill, please contact your pharmacy. Please allow 3 business days for your refill to be completed.  As always, Thank you for trusting us with your healthcare needs!      see additional instructions from your care team below

## 2022-08-22 ENCOUNTER — TELEPHONE (OUTPATIENT)
Dept: OBGYN | Facility: CLINIC | Age: 27
End: 2022-08-22

## 2022-08-22 ENCOUNTER — PRENATAL OFFICE VISIT (OUTPATIENT)
Dept: OBGYN | Facility: CLINIC | Age: 27
End: 2022-08-22
Payer: COMMERCIAL

## 2022-08-22 VITALS
DIASTOLIC BLOOD PRESSURE: 84 MMHG | WEIGHT: 147.8 LBS | HEART RATE: 132 BPM | BODY MASS INDEX: 28.87 KG/M2 | OXYGEN SATURATION: 98 % | SYSTOLIC BLOOD PRESSURE: 123 MMHG

## 2022-08-22 DIAGNOSIS — Z34.83 ENCOUNTER FOR SUPERVISION OF OTHER NORMAL PREGNANCY, THIRD TRIMESTER: Primary | ICD-10-CM

## 2022-08-22 PROCEDURE — 99207 PR PRENATAL VISIT: CPT | Performed by: OBSTETRICS & GYNECOLOGY

## 2022-08-22 NOTE — PATIENT INSTRUCTIONS
If you have any questions regarding your visit, Please contact your care team.    [employer] [facility]: [phone]    To Schedule an Appointment 24/7  Call: [phone] HOURS TELEPHONE NUMBER   She Burns DO.    TIMUR Gil -Surgery Scheduler  Katie - Surgery Scheduler    Hortencia, DANA Dao, RN  Celina, DANA   [place]  Wednesday and Friday  8:30 a.m-5:00 p.m    [place]-Temporary  Monday 8:30 a.m-5:00 p.m  Typical Surgery day:  Tuesday [facility]  [address] [place] [address] [phone] Phone  [phone] Fax    Imaging Scheduling-All Locations [phone]    [facility]  [address] [place] [address]     Urgent Care locations:  [facility] Monday-Friday   10 am - 8 pm  Saturday and Sunday   9 am - 5 pm [phone]   **Surgeries** Our Surgery Schedulers will contact you to schedule. If you do not receive a call within 3 business days, please call [phone].    [facility] Labor and Delivery:  [phone]    If you need a medication refill, please contact your pharmacy. Please allow 3 business days for your refill to be completed.  As always, Thank you for trusting us with your healthcare needs!  see additional instructions from your care team below

## 2022-08-22 NOTE — PROGRESS NOTES
She reports reassuring daily fetal activity and will continue to record.  She appears very uncomfortable with regular uterine contractions every 2 minutes for the past 1 hour so will check her cervix to determine change.  She denies any leakage of fluid but states that her contractions have become much more regular and intense.  She lost 2 lbs since her last visit but denies dieting.  Her cervix has made change today at 3 cm/75%/-1/intact/vertex so will send her to L&D for further evaluation.  However, the patient's mother prefers that her daughter go to [facility] in [place] because of proximity to her job, even though my group and I do not deliver there.  The pt was instructed to go directly to the L&D of her choice and her GBS status is negative.

## 2022-08-22 NOTE — TELEPHONE ENCOUNTER
38w1d    Pt called in requesting a scheduled induction.    Pt had office visit today, cervix in clinic today 3 cm/75%/-1/intact/vertex so sent to L&D for further evaluation. Pt evaluated in L&D ([facility]), no cervical change after 1 hour - discharged home.    Next appointment .    Routing to provider for review.    Sylwia Carolina RN on 2022 at 3:35 PM'

## 2022-08-22 NOTE — TELEPHONE ENCOUNTER
Ok to be seen in clinic this week to discuss induction to be scheduled for 39 weeks. Scheduled with Dr. Burns this Friday at 11:30am. Informed this is the [place] location.    Asia Pat CMA

## 2022-08-26 ENCOUNTER — PRENATAL OFFICE VISIT (OUTPATIENT)
Dept: OBGYN | Facility: CLINIC | Age: 27
End: 2022-08-26
Payer: COMMERCIAL

## 2022-08-26 VITALS
OXYGEN SATURATION: 100 % | BODY MASS INDEX: 29.24 KG/M2 | DIASTOLIC BLOOD PRESSURE: 73 MMHG | HEART RATE: 95 BPM | WEIGHT: 149.7 LBS | SYSTOLIC BLOOD PRESSURE: 109 MMHG

## 2022-08-26 DIAGNOSIS — Z34.83 ENCOUNTER FOR SUPERVISION OF OTHER NORMAL PREGNANCY, THIRD TRIMESTER: Primary | ICD-10-CM

## 2022-08-26 PROCEDURE — 99207 PR PRENATAL VISIT: CPT | Performed by: OBSTETRICS & GYNECOLOGY

## 2022-08-26 PROCEDURE — 59426 ANTEPARTUM CARE ONLY: CPT | Performed by: OBSTETRICS & GYNECOLOGY

## 2022-08-26 NOTE — PROGRESS NOTES
She reports reassuring daily fetal activity and will continue to record.  Her contractions have spaced and are much milder but she would like to have her cervix rechecked today since she requests an induction.  Her GBS is negative.  She gained 2 lbs since her last visit and she denies any fluid leakage or regular uterine contractions.  Her cervix is unfavorable today at 2 cm/50%/-2/intact/vertex so she does not meet criteria for an elective induction.  She is disappointed that she is not being sent to L&D today since she is tired of being pregnant.

## 2022-08-26 NOTE — PATIENT INSTRUCTIONS
If you have any questions regarding your visit, Please contact your care team.    [employer]: [phone]    To Schedule an Appointment 24/7  Call: [phone]-[facility] HOURS TELEPHONE NUMBER   She Burns DO.    TIMUR Gil -Surgery Scheduler  Katie - Surgery Scheduler    Hortencia, DANA Dao, RN  Celina, DANA   [place]  Wednesday and Friday  8:30 a.m-5:00 p.m    [place]-Temporary  Monday 8:30 a.m-5:00 p.m  Typical Surgery day:  Tuesday [facility]  [address] [place] [address] [phone] Phone  [phone] Fax    Imaging Scheduling-All Locations [phone]    [facility]  [address] [place] [address]     Urgent Care locations:  [facility] Monday-Friday   10 am - 8 pm  Saturday and Sunday   9 am - 5 pm [phone]   **Surgeries** Our Surgery Schedulers will contact you to schedule. If you do not receive a call within 3 business days, please call [phone].    [facility] Labor and Delivery:  [phone]    If you need a medication refill, please contact your pharmacy. Please allow 3 business days for your refill to be completed.  As always, Thank you for trusting us with your healthcare needs!  see additional instructions from your care team below

## 2022-08-29 ENCOUNTER — TELEPHONE (OUTPATIENT)
Dept: OBGYN | Facility: CLINIC | Age: 27
End: 2022-08-29

## 2022-08-29 NOTE — TELEPHONE ENCOUNTER
Pt called to notify her OB that she gave birth this Saturday 8/27 to a baby boy. She gave birth at 6:47AM.     Pt is still at the hospital.    Transferred to OB to schedule follow up visit.       Alisa Gutierrez RN  [facility]

## 2022-09-11 ENCOUNTER — HEALTH MAINTENANCE LETTER (OUTPATIENT)
Age: 27
End: 2022-09-11

## 2022-10-12 ENCOUNTER — PRENATAL OFFICE VISIT (OUTPATIENT)
Dept: OBGYN | Facility: CLINIC | Age: 27
End: 2022-10-12
Payer: COMMERCIAL

## 2022-10-12 VITALS
WEIGHT: 133.2 LBS | DIASTOLIC BLOOD PRESSURE: 80 MMHG | OXYGEN SATURATION: 98 % | HEART RATE: 86 BPM | HEIGHT: 60 IN | BODY MASS INDEX: 26.15 KG/M2 | SYSTOLIC BLOOD PRESSURE: 121 MMHG

## 2022-10-12 DIAGNOSIS — Z11.3 SCREEN FOR STD (SEXUALLY TRANSMITTED DISEASE): ICD-10-CM

## 2022-10-12 PROBLEM — Z34.80 SUPERVISION OF OTHER NORMAL PREGNANCY, ANTEPARTUM: Status: RESOLVED | Noted: 2022-01-22 | Resolved: 2022-10-12

## 2022-10-12 PROCEDURE — 87491 CHLMYD TRACH DNA AMP PROBE: CPT | Performed by: NURSE PRACTITIONER

## 2022-10-12 PROCEDURE — 87591 N.GONORRHOEAE DNA AMP PROB: CPT | Performed by: NURSE PRACTITIONER

## 2022-10-12 PROCEDURE — G0145 SCR C/V CYTO,THINLAYER,RESCR: HCPCS | Performed by: NURSE PRACTITIONER

## 2022-10-12 RX ORDER — DOCUSATE SODIUM 100 MG/1
CAPSULE, LIQUID FILLED ORAL
COMMUNITY
Start: 2022-08-29 | End: 2022-10-12

## 2022-10-12 ASSESSMENT — PATIENT HEALTH QUESTIONNAIRE - PHQ9
10. IF YOU CHECKED OFF ANY PROBLEMS, HOW DIFFICULT HAVE THESE PROBLEMS MADE IT FOR YOU TO DO YOUR WORK, TAKE CARE OF THINGS AT HOME, OR GET ALONG WITH OTHER PEOPLE: NOT DIFFICULT AT ALL
SUM OF ALL RESPONSES TO PHQ QUESTIONS 1-9: 2
SUM OF ALL RESPONSES TO PHQ QUESTIONS 1-9: 2

## 2022-10-12 NOTE — PROGRESS NOTES
Dennies is here for a postpartum checkup.    She had a primary  section at [facility] for maternal discomfort  OB History    Para Term  AB Living   3 1 1 0 2 1   SAB IAB Ectopic Multiple Live Births   1 1 0 0 1      # Outcome Date GA Lbr Wyatt/2nd Weight Sex Delivery Anes PTL Lv   3 Term 22 38w6d / 02:47 3.35 kg (7 lb 6.2 oz) M CS-Unspec   EVERETT      Name: PAYE,BB DENNIES      Apgar1: 8  Apgar5: 9   2 SAB      SAB      1 IAB      IAB         Since delivery, she has been breast feeding.  She has had a normal menses.  She has not had intercourse.  Patient screened for postpartum depression and complaints are NEGATIVE. Screening has also been completed for intimate partner violence. She would like to discuss: no concerns. Nexplanon [facility] 2 weeks ago at [facility].    O: This is a well appearing female in no acute distress. Answers questions and maintains eye contact appropriately. Vital signs noted.  RESPIRATORY: Clear to auscultation bilaterally.  CV: Regular rate and rhythm without murmur, gallop, rub  ABDOMEN: Soft, nontender, nondistended.  Incision: intact, no erythema, induration or discharge  Vulva: No external lesions, normal hair distribution, no adenopathy  BUS:  Normal, no masses noted  Vagina: Moist, pink, no abnormal discharge, well rugated, no lesions  Cervix: Pink, nulliparous, midline. Without cervical motion tenderness.  Uterus: Normal size and shape, non-tender, mobile  Ovaries: No masses, non-tender, mobile    A/P:  (Z39.2) Routine postpartum follow-up  (primary encounter diagnosis)  Plan: Pap imaged thin layer screen only - recommended        age 21 - 24 years         (Z11.3) Screen for STD (sexually transmitted disease)  Comment: Amenable to screening  Plan: Chlamydia trachomatis PCR, Neisseria         gonorrhoeae PCR         Iris Vargas APRN CNP        Answers for HPI/ROS submitted by the patient on 10/12/2022  If you checked off any problems, how difficult have  these problems made it for you to do your work, take care of things at home, or get along with other people?: Not difficult at all  PHQ9 TOTAL SCORE: 2

## 2022-10-12 NOTE — PATIENT INSTRUCTIONS
If you have any questions regarding your visit, Please contact your care team.     [employer] [facility] [employer]: [phone]  To Schedule an Appointment 24/7  Call: [phone]-[facility] HOURS TELEPHONE NUMBER     Iris Vargas- APRN CNP      Ashley Sousa-Surgery Scheduler  Katie-Surgery Scheduler         Monday 7:30 am-5:00 pm    Tuesday 8:00 am-4:00 pm    Wednesday 7:30 am-4:00 pm  [place]    Thursday 8:00 am-11:00 am    Friday 7:30 am-4:00 pm [address] [place] [address] [phone] ask for [facility]  [phone] Fax    Imaging Scheduling all locations  [phone]     [facility] Labor and Delivery  [address] [place] [address]   [place], [place] [id number] [phone]         Urgent Care locations:  [facility]   Monday-Friday  10 am - 8 pm  Saturday and Sunday   9 am - 5 pm     [phone]   If you need a medication refill, please contact your pharmacy. Please allow 3 business days for your refill to be completed.  As always, Thank you for trusting us with your healthcare needs!      see additional instructions from your care team below

## 2022-10-13 LAB
C TRACH DNA SPEC QL NAA+PROBE: NEGATIVE
N GONORRHOEA DNA SPEC QL NAA+PROBE: NEGATIVE

## 2022-10-17 LAB
BKR LAB AP GYN ADEQUACY: NORMAL
BKR LAB AP GYN INTERPRETATION: NORMAL
BKR LAB AP HPV REFLEX: NO
BKR LAB AP PREVIOUS ABNORMAL: NORMAL
PATH REPORT.COMMENTS IMP SPEC: NORMAL
PATH REPORT.COMMENTS IMP SPEC: NORMAL
PATH REPORT.RELEVANT HX SPEC: NORMAL

## 2023-02-04 ENCOUNTER — TELEPHONE (OUTPATIENT)
Dept: OBGYN | Facility: CLINIC | Age: 28
End: 2023-02-04
Payer: COMMERCIAL

## 2023-02-04 NOTE — TELEPHONE ENCOUNTER
Patient states she needs a TB chest X ray done for her work place. Does she need a referral first  From her PCP? Or can she  Just schedule the TB Chest X ray?    Please call her back to discuss. [phone]

## 2023-02-06 NOTE — TELEPHONE ENCOUNTER
Pt needs an appt with a provider either virtual or in person to get tb chest xray ordered. Called pt and lvm relaying message and to call back and talk to scheduling to schedule an appt.

## 2023-06-03 ENCOUNTER — HEALTH MAINTENANCE LETTER (OUTPATIENT)
Age: 28
End: 2023-06-03

## 2023-07-07 ENCOUNTER — DOCUMENTATION ONLY (OUTPATIENT)
Dept: PSYCHOLOGY | Facility: CLINIC | Age: 28
End: 2023-07-07
Payer: COMMERCIAL

## 2023-07-07 NOTE — PROGRESS NOTES
No show today. Patient was a reminder yesterday. No reply. Her next appt was scheduled for 10/16. This will be cancelled if writer won't hear from the patient by Monday 7/10

## 2023-10-16 ENCOUNTER — DOCUMENTATION ONLY (OUTPATIENT)
Dept: PSYCHOLOGY | Facility: CLINIC | Age: 28
End: 2023-10-16
Payer: COMMERCIAL

## 2023-11-28 ENCOUNTER — TELEPHONE (OUTPATIENT)
Dept: OBGYN | Facility: CLINIC | Age: 28
End: 2023-11-28
Payer: COMMERCIAL

## 2023-11-28 NOTE — TELEPHONE ENCOUNTER
Calling patient back.   C/O 10/10 abdominal pain x 2 days at site of  scar.   2022  Took ibuprofen reduced pain to 7/10  Pain worsens with eating/bending over and using the bathroom.    Patient wonders if ok to go to ED.  Advised, ok to go to the closest hospital ED now.  Patient agrees.

## 2023-11-28 NOTE — TELEPHONE ENCOUNTER
[facility] Call Center    Phone Message    May a detailed message be left on voicemail: yes     Reason for Call: Other: Pt is currently schedule to see Dr. Simons on 23. Pt states she is having pain on where her  scar is and it's been happening for a few weeks. Pt states she has been taking ibuprofen almost everyday and it's not helping with the pain and she is really worried about it. Pt would like to get in sooner if possible. Please advise and call pt. Thank you.      Action Taken: Other: MG BRICENO    Travel Screening: Not Applicable

## 2024-01-02 NOTE — PATIENT INSTRUCTIONS
If you have labs or imaging done, the results will automatically release in [email or web address] without an interpretation.  Your health care professional will review those results and send an interpretation with recommendations as soon as possible, but this may be 1-3 business days.    If you have any questions regarding your visit, please contact your care team.     [employer]: [phone]  [facility] CLINIC HOURS TELEPHONE NUMBER   Atul GOTTI Ronak .      Celina-DANA Burnett-DANA Sousa-Surgery Scheduler  Katie-         Monday-[place]  8:00 am-4:00 pm  [facility]  [facility]4:00 pm  Wednesday-[place] 8:00 am-4:00 pm  Thursday-[place] 8:00 am-4:00 pm    Typical Surgery Day Friday [facility]  [address] [place] [address]  PH: [phone] Fax    Imaging Scheduling all locations  PH: [phone]     [facility] Labor and Delivery  [address]  [place], [place] [address] [phone]    [facility]  [address] [place] [address]  PH: [phone]     **Surgeries** Our Surgery Schedulers will contact you to schedule. If you do not receive a call within 3 business days, please call [phone].  Urgent Care locations:  [facility]       Monday-Friday   10 am - 8 pm  Saturday and Sunday   9 am - 5 pm   [phone]   If you need a medication refill, please contact your pharmacy. Please allow 3 business days for your refill to be completed.  As always, Thank you for trusting us with your healthcare needs!

## 2024-01-03 ENCOUNTER — OFFICE VISIT (OUTPATIENT)
Dept: OBGYN | Facility: CLINIC | Age: 29
End: 2024-01-03

## 2024-01-03 VITALS
DIASTOLIC BLOOD PRESSURE: 83 MMHG | WEIGHT: 138.4 LBS | HEART RATE: 96 BPM | OXYGEN SATURATION: 98 % | BODY MASS INDEX: 27.03 KG/M2 | SYSTOLIC BLOOD PRESSURE: 122 MMHG

## 2024-01-03 DIAGNOSIS — L90.5 PAIN IN SURGICAL SCAR: Primary | ICD-10-CM

## 2024-01-03 DIAGNOSIS — R52 PAIN IN SURGICAL SCAR: Primary | ICD-10-CM

## 2024-01-03 PROCEDURE — 99203 OFFICE O/P NEW LOW 30 MIN: CPT | Performed by: GENERAL PRACTICE

## 2024-01-03 NOTE — PROGRESS NOTES
HPI:   Dennies is a 28 year old  here for c section pain. States she has intermittent pain with certain movements, especially bending over. Coughing makes it worse too. Pain is worse around her menstrual cycle. Has been present since c section in 2022. Ibuprofen makes the pain better. Will treat with ibuprofen 1-2 times per month as needed. She also complains of very painful cramping with her menstrual cycle for which the nexplanon is helpful.       She is currently using nexplanon for birth control.        ROS:   Weight loss or gain: denies  Abdominal bloating / pain: denies  Appetite: normal  Energy: normal  Nausea / vomiting: denies  Fevers / chills / night sweats: denies  SOB / cough: denies  Chest pain / palpitations: denies  Diarrhea / constipation: denies  Bloody / tar-colored stools: denies  Urinary frequency / urgency / blood: denies  Headaches / vision changes: denies  Mouth sores: denies  Skin changes / rashes: denies      GYNHx:   No LMP recorded.  Cycles: irregular with nexplanon      OBHx:  OB History    Para Term  AB Living   3 1 1 0 2 1   SAB IAB Ectopic Multiple Live Births   1 1 0 0 1      # Outcome Date GA Lbr Wyatt/2nd Weight Sex Delivery Anes PTL Lv   3 Term 22 38w6d / 02:47 3.35 kg (7 lb 6.2 oz) M CS-Unspec   EVERETT      Name: ARNALDOBB DENNIES      Apgar1: 8  Apgar5: 9   2 SAB      SAB      1 IAB      IAB           PSH:   Past Surgical History:   Procedure Laterality Date     SECTION      HC INSERTION INTRAUTERINE DEVICE      HC REMOVE INTRAUTERINE DEVICE         PMH:  Past Medical History:   Diagnosis Date    Known health problems: none     Nexplanon in place 2022    Left arm - [facility]        MEDs   Current Outpatient Medications   Medication    etonogestrel (NEXPLANON) 68 MG IMPL    Prenatal Vit-Fe Fumarate-FA (PRENATAL MULTIVITAMIN W/IRON) 27-0.8 MG tablet     No current facility-administered medications for this visit.        Allergies:  Allergies   Allergen Reactions    Nuts Anaphylaxis and Hives     Peanut allergy       FamHx:  Family History   Problem Relation Age of Onset    No Known Problems Mother     No Known Problems Father     No Known Problems Sister     No Known Problems Brother        SocHx:  Social History     Socioeconomic History    Marital status: Single     Spouse name: partner-Yasir    Number of children: 0    Years of education: Not on file    Highest education level: Not on file   Occupational History    Occupation: direct support care   Tobacco Use    Smoking status: Never    Smokeless tobacco: Never   Vaping Use    Vaping Use: Never used   Substance and Sexual Activity    Alcohol use: No    Drug use: No    Sexual activity: Not Currently     Partners: Male     Birth control/protection: None   Other Topics Concern    Parent/sibling w/ CABG, MI or angioplasty before 65F 55M? No   Social History Narrative    From [facility] to [place] in [place].     Social Determinants of Health     Financial Resource Strain: Not on file   Food Insecurity: Not on file   Transportation Needs: Not on file   Physical Activity: Not on file   Stress: Not on file   Social Connections: Not on file   Interpersonal Safety: Not on file   Housing Stability: Not on file             PE:   /83 (BP Location: Left arm, Patient Position: Chair, Cuff Size: Adult Regular)   Pulse 96   Wt 62.8 kg (138 lb 6.4 oz)   SpO2 98%   BMI 27.03 kg/m    Body mass index is 27.03 kg/m .    General Appearance:  healthy, alert, active, no distress  HEENT: NC/AT, supple, trachea midline, no goiter  CV: well perfused  Lungs: non-labored breathing  Breast: not performed  Neuro: normal gait, balance  Skin: no lesions, visible rashes/bruising  Psych: appropriate mood/affect  Abdomen: soft, no pain in 4 quadrants. On exam of c section scar, keloid is noted. Patient with significant pain with palpation of scar only for 1-2 cms in the midline. No pain of scar laterally. A  mass is not appreciated however exam is limited to significant pain.    Pelvic: not indicated      RADS  None for review       LABS  None for review     Op report reviewed from c section which was unremarkable.      A/P:  27yo F with significant c section scar pain which is worsened with menstrual cycle. Mass not appreciated however exam limited due to patient discomfort. DDx includes scar pain vs endometriotic implants within c section scar. Will obtain MRI to determine if mass is present. Management options include excision if mass is present vs serial trigger point injections if no mass is present. Could also try additional hormonal therapy however she is using nexaplanon which often works well for endometriosis related pain. All questions answered. Will follow up once MRI results are available. May continue to use full dose ibuprofen for pain prn        25 min spent on the date of the encounter in chart review, patient visit, review of tests, documentation and/or discussion with other providers about the issues documented above.     Atul Simons DO, FACOG

## 2024-01-23 ENCOUNTER — OFFICE VISIT (OUTPATIENT)
Dept: URGENT CARE | Facility: URGENT CARE | Age: 29
End: 2024-01-23

## 2024-01-23 VITALS
SYSTOLIC BLOOD PRESSURE: 129 MMHG | OXYGEN SATURATION: 100 % | TEMPERATURE: 99.2 F | HEART RATE: 76 BPM | BODY MASS INDEX: 26.93 KG/M2 | WEIGHT: 137.9 LBS | DIASTOLIC BLOOD PRESSURE: 84 MMHG

## 2024-01-23 DIAGNOSIS — G89.18 PAIN AT SURGICAL SITE: Primary | ICD-10-CM

## 2024-01-23 DIAGNOSIS — G89.29 OTHER CHRONIC PAIN: ICD-10-CM

## 2024-01-23 PROCEDURE — 99213 OFFICE O/P EST LOW 20 MIN: CPT | Performed by: PHYSICIAN ASSISTANT

## 2024-01-23 ASSESSMENT — ENCOUNTER SYMPTOMS
NAUSEA: 0
CHILLS: 0
BLOOD IN STOOL: 0
ABDOMINAL PAIN: 0
PALPITATIONS: 0
CARDIOVASCULAR NEGATIVE: 1
DIARRHEA: 0
FEVER: 0
VOMITING: 0
FATIGUE: 0
WOUND: 0
COLOR CHANGE: 0
CONSTIPATION: 0

## 2024-01-23 NOTE — LETTER
January 23, 2024      Dennies Paye  [address]   [facility] [address]        To Whom It May Concern:    Dennies Paye was seen in urgent care clinic today and is able to return to work on 1/24/24.  Please feel free to contact me via phone if you have any questions or concerns.        Sincerely,      Priscila See REBEL Wilhelm

## 2024-01-23 NOTE — PROGRESS NOTES
Subjective Dennies is a 28 year old, presenting for the following health issues:  WORK NOTE (PT WAS SEEN ON 1/3//24 BY OBGYN, HER EMPLOYER WOULD LIKE A NOTE SAYING IT IS OK FOR HER TO RETURN TO WORK. )    HPI   Concern - return to work letter  Onset: chronic  Description:  Had seen GYN specialist 3weeks ago for chronic pain at her  scar from  in which MRI was recommended.  She did not follow through with this as her pain had improved.  She now needs a letter to return to work.  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  Intensity: mild  Progression of Symptoms:  improving  Accompanying Signs & Symptoms: No abdominal pain, n/v, constipation, diarrhea, bloody or black tarry stools.  No fever, chills or sweats.  No redness, swelling or drainage at the  site  Previous history of similar problem: Yes  Precipitating factors:        Worsened by: none  Alleviating factors:        Improved by: none  Therapies tried and outcome: rest,fluids,tyleno with some relief    Patient Active Problem List   Diagnosis    Health Care Home    Normal labor     Current Outpatient Medications   Medication    etonogestrel (NEXPLANON) 68 MG IMPL    Prenatal Vit-Fe Fumarate-FA (PRENATAL MULTIVITAMIN W/IRON) 27-0.8 MG tablet     No current facility-administered medications for this visit.        Allergies   Allergen Reactions    Nuts Anaphylaxis and Hives     Peanut allergy       Review of Systems   Constitutional:  Negative for chills, fatigue and fever.   Cardiovascular: Negative.  Negative for chest pain, palpitations and leg swelling.   Gastrointestinal:  Negative for abdominal pain, blood in stool, constipation, diarrhea, nausea and vomiting.   Skin:  Negative for color change, pallor, rash and wound.   All other systems reviewed and are negative.          Objective    /84   Pulse 76   Temp 99.2  F (37.3  C) (Oral)   Wt 62.6 kg (137 lb 14.4 oz)   SpO2 100%   BMI 26.93 kg/m    Body mass index is  26.93 kg/m .  Physical Exam  Vitals and nursing note reviewed.   Constitutional:       General: She is not in acute distress.     Appearance: Normal appearance. She is normal weight. She is not ill-appearing.   Abdominal:      General: Abdomen is flat. Bowel sounds are normal. There is no distension.      Palpations: Abdomen is soft. There is no mass.      Tenderness: There is no abdominal tenderness. There is no right CVA tenderness, left CVA tenderness, guarding or rebound. Negative signs include Mares's sign, Rovsing's sign and McBurney's sign.   Skin:     General: Skin is warm.      Capillary Refill: Capillary refill takes less than 2 seconds.   Neurological:      Mental Status: She is alert and oriented to person, place, and time.   Psychiatric:         Mood and Affect: Mood normal.         Behavior: Behavior normal.         Thought Content: Thought content normal.         Judgment: Judgment normal.              Assessment/Plan:  Pain at surgical site:  Chronic since 2022.  No signs of infection.  Pain has improved and feels like she is able to return to work.  Letter to return to work was given to patient.  Recheck in clinic with GYN if symptoms worsen or if symptoms do not improve.    Other chronic pain        Priscila See REBEL Wilhelm

## 2024-07-13 ENCOUNTER — HEALTH MAINTENANCE LETTER (OUTPATIENT)
Age: 29
End: 2024-07-13

## 2025-01-28 ENCOUNTER — ANCILLARY PROCEDURE (OUTPATIENT)
Dept: ULTRASOUND IMAGING | Facility: CLINIC | Age: 30
End: 2025-01-28
Attending: EMERGENCY MEDICINE
Payer: COMMERCIAL

## 2025-01-28 ENCOUNTER — OFFICE VISIT (OUTPATIENT)
Dept: URGENT CARE | Facility: URGENT CARE | Age: 30
End: 2025-01-28
Payer: COMMERCIAL

## 2025-01-28 ENCOUNTER — OFFICE VISIT (OUTPATIENT)
Dept: PEDIATRICS | Facility: CLINIC | Age: 30
End: 2025-01-28
Payer: COMMERCIAL

## 2025-01-28 VITALS
DIASTOLIC BLOOD PRESSURE: 82 MMHG | OXYGEN SATURATION: 100 % | HEART RATE: 84 BPM | TEMPERATURE: 98.2 F | RESPIRATION RATE: 16 BRPM | WEIGHT: 145.9 LBS | SYSTOLIC BLOOD PRESSURE: 114 MMHG | BODY MASS INDEX: 28.49 KG/M2

## 2025-01-28 VITALS
OXYGEN SATURATION: 100 % | TEMPERATURE: 98.6 F | HEART RATE: 86 BPM | DIASTOLIC BLOOD PRESSURE: 79 MMHG | RESPIRATION RATE: 16 BRPM | SYSTOLIC BLOOD PRESSURE: 115 MMHG

## 2025-01-28 DIAGNOSIS — R10.32 LLQ ABDOMINAL PAIN: Primary | ICD-10-CM

## 2025-01-28 DIAGNOSIS — Z32.01 PREGNANCY EXAMINATION OR TEST, POSITIVE RESULT: ICD-10-CM

## 2025-01-28 DIAGNOSIS — R10.2 PELVIC PAIN IN FEMALE: ICD-10-CM

## 2025-01-28 DIAGNOSIS — Z34.90 PREGNANCY, UNSPECIFIED GESTATIONAL AGE: ICD-10-CM

## 2025-01-28 DIAGNOSIS — R10.2 PELVIC PAIN IN FEMALE: Primary | ICD-10-CM

## 2025-01-28 LAB
ALBUMIN UR-MCNC: NEGATIVE MG/DL
APPEARANCE UR: CLEAR
BACTERIA #/AREA URNS HPF: NORMAL /HPF
BILIRUB UR QL STRIP: NEGATIVE
COLOR UR AUTO: YELLOW
GLUCOSE UR STRIP-MCNC: NEGATIVE MG/DL
HCG INTACT+B SERPL-ACNC: 250 MIU/ML
HCG UR QL: POSITIVE
HGB UR QL STRIP: NEGATIVE
KETONES UR STRIP-MCNC: NEGATIVE MG/DL
LEUKOCYTE ESTERASE UR QL STRIP: NEGATIVE
NITRATE UR QL: POSITIVE
PH UR STRIP: 7 [PH] (ref 5–7)
RBC #/AREA URNS AUTO: NORMAL /HPF
SP GR UR STRIP: 1.01 (ref 1–1.03)
UROBILINOGEN UR STRIP-ACNC: 0.2 E.U./DL
WBC #/AREA URNS AUTO: NORMAL /HPF

## 2025-01-28 PROCEDURE — 99213 OFFICE O/P EST LOW 20 MIN: CPT | Performed by: EMERGENCY MEDICINE

## 2025-01-28 PROCEDURE — 81001 URINALYSIS AUTO W/SCOPE: CPT | Performed by: PHYSICIAN ASSISTANT

## 2025-01-28 PROCEDURE — 84702 CHORIONIC GONADOTROPIN TEST: CPT | Performed by: EMERGENCY MEDICINE

## 2025-01-28 PROCEDURE — 99207 REFERRAL TO ACUTE AND DIAGNOSTIC SERVICES: CPT | Performed by: PHYSICIAN ASSISTANT

## 2025-01-28 PROCEDURE — 87086 URINE CULTURE/COLONY COUNT: CPT | Performed by: PHYSICIAN ASSISTANT

## 2025-01-28 PROCEDURE — 36415 COLL VENOUS BLD VENIPUNCTURE: CPT | Performed by: EMERGENCY MEDICINE

## 2025-01-28 PROCEDURE — 81025 URINE PREGNANCY TEST: CPT | Performed by: PHYSICIAN ASSISTANT

## 2025-01-28 RX ORDER — CEPHALEXIN 500 MG/1
500 CAPSULE ORAL 2 TIMES DAILY
Qty: 14 CAPSULE | Refills: 0 | Status: SHIPPED | OUTPATIENT
Start: 2025-01-28 | End: 2025-02-04

## 2025-01-28 ASSESSMENT — ENCOUNTER SYMPTOMS
CHILLS: 0
CONSTIPATION: 0
CARDIOVASCULAR NEGATIVE: 1
DIZZINESS: 1
FLANK PAIN: 0
FREQUENCY: 0
NAUSEA: 1
FATIGUE: 1
DYSURIA: 0
BLOOD IN STOOL: 0
FEVER: 0
HEMATURIA: 0
ABDOMINAL PAIN: 0
VOMITING: 1
PALPITATIONS: 0
DIARRHEA: 0

## 2025-01-28 ASSESSMENT — PAIN SCALES - GENERAL: PAINLEVEL_OUTOF10: SEVERE PAIN (8)

## 2025-01-28 NOTE — PROGRESS NOTES
Subjective Dennies is a 29 year old, presenting for the following health issues:  Vomiting (Vomiting, nausea, headaches and dizziness since this morning), Breast Pain (Breast soreness, missed period .), and Abdominal Pain (Lower abdominal pain for about an hour.)  HPI   Abdominal/Flank Pain  Onset/Duration: today  Description:   Character: cramping  Location: pelvic  Radiation: None  Intensity: moderate  Progression of Symptoms:  same  Accompanying Signs & Symptoms:  Fever/Chills: no  Gas/Bloating: no  Nausea: Yes, breast tenderness, fatigue, dizziness.   Vomitting: Yes  Diarrhea: no  Constipation: no  Dysuria or Hematuria: No dysuria, urinary frequency, urgency or hematuria.  No vaginal d/c, bleeding, rashes and irritation.  No new partners.  LMP 12/26/24.  Took 2 home UPT which came back positive.  History:   Trauma: no  Previous similar pain: no  Previous tests done: none  Precipitating factors:   Does the pain change with:     Food: no    Bowel Movement: no    Urination: no   Other factors:  no  Therapies tried and outcome: fluids, rest with minimal relief     Patient Active Problem List   Diagnosis    Normal labor     Current Outpatient Medications   Medication Sig Dispense Refill    etonogestrel (NEXPLANON) 68 MG IMPL 1 each by Subdermal route once (Patient not taking: Reported on 1/28/2025)      Prenatal Vit-Fe Fumarate-FA (PRENATAL MULTIVITAMIN W/IRON) 27-0.8 MG tablet Take 1 tablet by mouth daily (Patient not taking: Reported on 1/28/2025) 90 tablet 3     No current facility-administered medications for this visit.        Allergies   Allergen Reactions    Nuts Anaphylaxis and Hives     Peanut allergy     Review of Systems   Constitutional:  Positive for fatigue. Negative for chills and fever.   Cardiovascular: Negative.  Negative for chest pain, palpitations and leg swelling.   Gastrointestinal:  Positive for nausea and vomiting. Negative for abdominal pain, blood in stool, constipation and diarrhea.    Genitourinary:  Positive for pelvic pain. Negative for dysuria, flank pain, frequency, hematuria, urgency, vaginal bleeding and vaginal discharge.   Neurological:  Positive for dizziness.   All other systems reviewed and are negative.          Objective    /82 (BP Location: Left arm, Patient Position: Sitting, Cuff Size: Adult Regular)   Pulse 84   Temp 98.2  F (36.8  C) (Oral)   Resp 16   Wt 66.2 kg (145 lb 14.4 oz)   LMP 12/26/2024 (Exact Date)   SpO2 100%   BMI 28.49 kg/m    Body mass index is 28.49 kg/m .  Physical Exam  Vitals and nursing note reviewed.   Constitutional:       General: She is not in acute distress.     Appearance: Normal appearance. She is well-developed and normal weight. She is not ill-appearing.   Cardiovascular:      Rate and Rhythm: Normal rate and regular rhythm.      Pulses: Normal pulses.      Heart sounds: Normal heart sounds, S1 normal and S2 normal. No murmur heard.     No friction rub. No gallop.   Pulmonary:      Effort: Pulmonary effort is normal. No accessory muscle usage or respiratory distress.      Breath sounds: Normal breath sounds and air entry. No decreased breath sounds, wheezing, rhonchi or rales.   Abdominal:      General: Abdomen is flat. Bowel sounds are normal.      Palpations: Abdomen is soft. There is no hepatomegaly, splenomegaly or mass.      Tenderness: There is abdominal tenderness in the suprapubic area. There is guarding and rebound. There is no right CVA tenderness or left CVA tenderness. Negative signs include Mares's sign, Rovsing's sign, McBurney's sign, psoas sign and obturator sign.      Hernia: No hernia is present.   Skin:     General: Skin is warm and dry.   Neurological:      Mental Status: She is alert and oriented to person, place, and time.   Psychiatric:         Mood and Affect: Mood normal.         Behavior: Behavior normal.         Thought Content: Thought content normal.         Judgment: Judgment normal.        Results for  orders placed or performed in visit on 01/28/25 (from the past 24 hours)   UA Macroscopic with reflex to Microscopic and Culture    Specimen: Urine, Midstream   Result Value Ref Range    Color Urine Yellow Colorless, Straw, Light Yellow, Yellow    Appearance Urine Clear Clear    Glucose Urine Negative Negative mg/dL    Bilirubin Urine Negative Negative    Ketones Urine Negative Negative mg/dL    Specific Gravity Urine 1.010 1.003 - 1.035    Blood Urine Negative Negative    pH Urine 7.0 5.0 - 7.0    Protein Albumin Urine Negative Negative mg/dL    Urobilinogen Urine 0.2 0.2, 1.0 E.U./dL    Nitrite Urine Positive (A) Negative    Leukocyte Esterase Urine Negative Negative   HCG Qual, Urine ([id number])   Result Value Ref Range    hCG Urine Qualitative Positive (A) Negative   Urine Microscopic Exam   Result Value Ref Range    Bacteria Urine None Seen None Seen /HPF    RBC Urine None Seen 0-2 /HPF /HPF    WBC Urine None Seen 0-5 /HPF /HPF           Assessment/Plan:  Pelvic pain in female:  Along with positive UPT and abnormal UA (micro was normal).  H&P is concerning for ectopic pregnancy/pregnancy complications vs UTI vs STD.  Recommend further evaluation and management in the [facility].  Will most likely need further workup with labs and/or imaging.   Discussed case with [facility] who has agreed to take on the patient.   Patient has declined transportation via ambulance and will have family drive her/him.  Understands risks and benefits of ambulance transfer and s/he has declined.  Call 911 if worsening symptoms.  S/he plans to go to [facility].  S/he left in stable condition with AVS in hand.  F/u with PCP after [facility] visit.   -     UA Macroscopic with reflex to Microscopic and Culture  -     HCG Qual, Urine ([id number])  -     Urine Microscopic Exam  -     Urine Culture  -     Referral to Acute and Diagnostic Services (Day of diagnostic / First order acute); Future  -     cephALEXin (KEFLEX) 500 MG capsule; Take 1 capsule (500 mg)  by mouth 2 times daily for 7 days.    Pregnancy examination or test, positive result        Priscila See REBEL Wilhelm

## 2025-01-28 NOTE — PROGRESS NOTES
Acute and Diagnostic Services Clinic Visit    Assessment & Plan     LLQ abdominal pain in first trimester pregnancy  She had a ua checked earlier today with positive upt and nitrites in urine.  Given keflex for possible infection. Referred here for ultrasound to r/o ectopic.  Denies vaginal discharge or bleeding. Quantitative beta hcg is 250.  Ultrasound was unremarkable.  It is unclear if she had a fetal demise, has an ectopic or if she is very earlier in her pregancy and dates are off.  I have ordered a 2 repeat beta hcg test with an appointment to see her OB in 3 days to discuss results and determine further work up needed.     - HCG quantitative pregnancy  - US OB < 14 Weeks Single Transabdominal          23 minutes spent by me on the date of the encounter doing chart review, review of test results, interpretation of tests, patient visit, and documentation         Patient Instructions   A follow up appointment has been scheduled for you to see OB this Friday.  You will need to get your blood rechecked prior to that visit.     Seek medical attention if worsening/concerns.     No follow-ups on file.    Subjective Dennies is a 29 year old, presenting for the following health issues:  R/O Eptopic pregnancy     The patient is a 30 yo female  who presents to the [facility] for evaluation of llq pain that started this morning.  She just found out she was pregnant today.  She says her LMP 24 and she had 2 menses in December.  She was seen in clinic and referred here for ultrasound to evaluate pregnancy location given she is having llq pain. In clinic today they checked her urine and prescribed keflex for a urine infection.  She denies vaginal bleeding or unusual discharge. Her pregnancy was delivered at term via c section.          Abdominal/Flank Pain  Onset/Duration:  am  Description:   Character: Cramping  Location: pelvic region  Radiation: Back  Intensity: moderate  Progression of Symptoms:  same and  intermittent  Accompanying Signs & Symptoms:  Fever/chills: no   Gas/Bloating: YES- gas  Nausea: YES  Vomitting: YES  Diarrhea: YES  Constipation:no   Dysuria: no            Hematuria: no            Frequency: no            Incontinence of urine: no   History:            Last bowel movement: today  Trauma: no   Previous similar pain: YES- when she was pregnant with 1st child   Previous tests done: ultrasound           Previous Abdominal surgery: YES- C section 2 years ago  Precipitating factors:   Does the pain change with:     Food: no      Bowel Movement: no     Urination: no              Other factors: no   Therapies tried and outcome:  none    When food last eaten: 1/28            Objective    LMP 12/26/2024 (Exact Date)   There is no height or weight on file to calculate BMI.  Physical Exam  Vitals and nursing note reviewed.   HENT:      Head: Normocephalic and atraumatic.      Nose: Nose normal.   Eyes:      Extraocular Movements: Extraocular movements intact.   Cardiovascular:      Rate and Rhythm: Normal rate and regular rhythm.      Heart sounds: Normal heart sounds.   Pulmonary:      Effort: Pulmonary effort is normal.      Breath sounds: Normal breath sounds.   Abdominal:      General: Bowel sounds are normal. There is no distension.      Palpations: Abdomen is soft. There is no mass.      Tenderness: There is abdominal tenderness. There is no guarding or rebound.      Hernia: No hernia is present.          Comments: Old c section scar. Pain point specific just lateral to scar. No hernia felt.    Musculoskeletal:         General: Normal range of motion.      Cervical back: Normal range of motion.   Skin:     General: Skin is warm and dry.      Findings: No erythema or rash.   Neurological:      General: No focal deficit present.      Mental Status: She is alert and oriented to person, place, and time.   Psychiatric:         Mood and Affect: Mood normal.         Behavior: Behavior normal.             Results for orders placed or performed in visit on 01/28/25 (from the past 24 hours)   HCG quantitative pregnancy   Result Value Ref Range    hCG Quantitative 250 (H) <5 mIU/mL   US OB < 14 Weeks Single Transabdominal    Narrative    EXAM: US OB < 14 WEEKS SINGLE-TRANSABDOMINAL  LOCATION: [facility]  DATE: 1/28/2025    INDICATION: llq pain.  lmp 12 26 25  r o ectopic  COMPARISON: None.  TECHNIQUE: Transabdominal scans were performed. Endovaginal ultrasound was performed to better visualize the embryo.    FINDINGS:  UTERUS: Normal. The endometrial stripe is 15 mm in diameter. No intrauterine gestational sac, yolk sac, or fetal pole.    RIGHT OVARY: Normal with a corpus luteum.  LEFT OVARY: Normal.      Impression    IMPRESSION:   No intrauterine pregnancy identified. It may be too early for visualization of an intrauterine pregnancy. No ectopic pregnancy appreciated on this study. Failed early pregnancy is possible. Correlate with serial laboratory values and consider follow-up   imaging.                Signed Electronically by: Tammie Yu MD

## 2025-01-28 NOTE — PATIENT INSTRUCTIONS
A follow up appointment has been scheduled for you to see OB this Friday.  You will need to get your blood rechecked prior to that visit.     Seek medical attention if worsening/concerns.

## 2025-01-30 ENCOUNTER — LAB (OUTPATIENT)
Dept: LAB | Facility: CLINIC | Age: 30
End: 2025-01-30
Payer: COMMERCIAL

## 2025-01-30 DIAGNOSIS — Z34.90 PREGNANCY, UNSPECIFIED GESTATIONAL AGE: ICD-10-CM

## 2025-01-30 DIAGNOSIS — R10.32 LLQ ABDOMINAL PAIN: ICD-10-CM

## 2025-01-30 LAB
BACTERIA UR CULT: NORMAL
HCG INTACT+B SERPL-ACNC: 634 MIU/ML

## 2025-02-01 ENCOUNTER — LAB (OUTPATIENT)
Dept: LAB | Facility: CLINIC | Age: 30
End: 2025-02-01
Payer: COMMERCIAL

## 2025-02-01 DIAGNOSIS — Z34.01 ENCOUNTER FOR SUPERVISION OF NORMAL FIRST PREGNANCY IN FIRST TRIMESTER: ICD-10-CM

## 2025-02-01 LAB — HCG INTACT+B SERPL-ACNC: 1589 MIU/ML

## 2025-02-01 PROCEDURE — 84702 CHORIONIC GONADOTROPIN TEST: CPT

## 2025-02-01 PROCEDURE — 36415 COLL VENOUS BLD VENIPUNCTURE: CPT

## 2025-02-03 DIAGNOSIS — Z34.81 ENCOUNTER FOR SUPERVISION OF OTHER NORMAL PREGNANCY, FIRST TRIMESTER: Primary | ICD-10-CM

## 2025-02-11 ENCOUNTER — ANCILLARY PROCEDURE (OUTPATIENT)
Dept: ULTRASOUND IMAGING | Facility: CLINIC | Age: 30
End: 2025-02-11
Attending: OBSTETRICS & GYNECOLOGY
Payer: COMMERCIAL

## 2025-02-11 DIAGNOSIS — Z34.81 ENCOUNTER FOR SUPERVISION OF OTHER NORMAL PREGNANCY, FIRST TRIMESTER: ICD-10-CM

## 2025-02-11 PROCEDURE — 76801 OB US < 14 WKS SINGLE FETUS: CPT | Mod: TC | Performed by: RADIOLOGY

## 2025-02-11 PROCEDURE — 76817 TRANSVAGINAL US OBSTETRIC: CPT | Mod: TC | Performed by: RADIOLOGY

## 2025-03-11 ENCOUNTER — TELEPHONE (OUTPATIENT)
Dept: OBGYN | Facility: CLINIC | Age: 30
End: 2025-03-11
Payer: COMMERCIAL

## 2025-03-11 NOTE — TELEPHONE ENCOUNTER
[facility] Call Center    Phone Message    May a detailed message be left on voicemail: no     Reason for Call: Pt had US 1/28 and saw Dr Burns 1/31 for pregnancy and was told to call when she was 8 or 9 weeks to see provider again. Pt scheduled for Nurse intake 3/21 and first OB 4/2. Sending TE per secure chat request.  Thank you    Action Taken: Message routed to:  [facility] p [phone]    Travel Screening: Not Applicable

## 2025-03-12 NOTE — TELEPHONE ENCOUNTER
Offered pt to see Dr. Burns for her first prenatal visit on 3/19, however, pt declined because she has work that day.  Offered her Dr. Branch's next opening which is 3/26.  This day/time works for pt.  Scheduled.  No need to change pt's intake as it is scheduled for 3/21.    Hortencia Stafford RN- OB/GYN group

## 2025-03-12 NOTE — TELEPHONE ENCOUNTER
ELADIO based on US is 10/8/25.  Pt is currently 10w0d.    Called pt to help her schedule her initial prenatal visits sooner.    Unable to reach patient via phone. Left message to call back at [phone].    Hortencia Stafford RN- OB/GYN group

## 2025-03-21 PROBLEM — O47.00 PRETERM UTERINE CONTRACTIONS: Status: ACTIVE | Noted: 2022-07-05

## 2025-03-21 PROBLEM — R10.30 LOWER ABDOMINAL PAIN: Status: ACTIVE | Noted: 2022-06-02

## 2025-03-24 NOTE — PATIENT INSTRUCTIONS
If you wish to schedule another appointment, please call our office at [phone]. You can also make appointments through [facility]  Return to clinic:     Every 4 weeks till 28 weeks, then every 2 weeks till 36 weeks, then weekly till delivery    If anything comes up between your visits or you have concerns, please don't hesitate to contact us.                                            If you have labs or imaging done, the results will automatically release in [facility] without an interpretation.  Your health care professional will review those results and send an interpretation with recommendations as soon as possible, but this may be 1-3 business days.    If you have any questions regarding your visit, please contact your care team.     [facility]: [phone]  Women s Health CLINIC HOURS TELEPHONE NUMBER   She BurnsDOMary    Merry -Surgery Scheduler  Katie -     DANA Burnett, DANA Patrick RN   [place]    Monday 8:30 am-5:00 pm  Wednesday 8:30 am-5:00 pm  Friday 8:30 am-5:00 pm    Typical Surgery day: Tuesday [facility]  [address]  [place] [address]  Phone:  [phone]  Fax: [phone]   Appointment [phone]    Imaging Scheduling-All Locations [phone]    [facility]  [address] [place] [address]   [place] [address] [phone]   **Surgeries** Our Surgery Schedulers will contact you to schedule. If you do not receive a call within 3 business days, please call [phone].  Urgent Care locations:  [facility] Monday-Friday   10 am - 8 pm  Saturday and    9 am - 5 pm [phone]   If you need a medication refill, please contact your pharmacy. Please allow 3 business days for your refill to be completed.  As always, Thank you for trusting us with your healthcare needs!  see additional instructions from your care team below    Weeks 10 to 14 of Your  "Pregnancy: Care Instructions  It's now possible to hear the fetus's heartbeat with a special ultrasound device. And the fetus's organs are developing.    Decide about tests to check for birth defects. Think about your age, your chance of passing on a family disease, your need to know about any problems, and what you might do after you have the test results.   It's okay to exercise. Try activities such as walking or swimming. Check with your doctor before starting a new program.     You may feel more tired than usual.  Taking naps during the day may help.     You may feel emotional.  It might help to talk to someone.     You may have headaches.  Try lying down and putting a cool cloth over your forehead.     You can use acetaminophen (Tylenol) for pain relief.  Don't take any anti-inflammatory medicines (such as Advil, Motrin, Aleve), unless your doctor says it's okay.     You may feel a fullness or aching in your lower belly.  This can feel like the kind of cramps you might get before a period. A back rub may help.     You may need to urinate more.  Your growing uterus and changing hormones can affect your bladder.     You may feel sick to your stomach (morning sickness).  Try avoiding food and smells that make you feel sick.     Your breasts may feel different.  They may feel tender or get bigger. Your nipples may get darker. Try a bra that gives you good support.     Avoid alcohol, tobacco, and drugs (including marijuana).  If you need help quitting, talk to your doctor.     Take a daily prenatal vitamin.  Choose one with folic acid.   Follow-up care is a key part of your treatment and safety. Be sure to make and go to all appointments, and call your doctor if you are having problems. It's also a good idea to know your test results and keep a list of the medicines you take.  Where can you learn more?  Go to https://www.healthwise.net/patiented  Enter E090 in the search box to learn more about \"Weeks 10 to 14 of " "Your Pregnancy: Care Instructions.\"  Current as of: April 30, 2024  Content Version: 14.4    3776-6310 [employer].   Care instructions adapted under license by your healthcare professional. If you have questions about a medical condition or this instruction, always ask your healthcare professional. [employer] disclaims any warranty or liability for your use of this information.    "

## 2025-03-25 LAB
ABO + RH BLD: NORMAL
BLD GP AB SCN SERPL QL: NEGATIVE
SPECIMEN EXP DATE BLD: NORMAL

## 2025-03-26 ENCOUNTER — PRENATAL OFFICE VISIT (OUTPATIENT)
Dept: OBGYN | Facility: CLINIC | Age: 30
End: 2025-03-26
Payer: COMMERCIAL

## 2025-03-26 VITALS
SYSTOLIC BLOOD PRESSURE: 116 MMHG | DIASTOLIC BLOOD PRESSURE: 67 MMHG | WEIGHT: 140.2 LBS | BODY MASS INDEX: 27.38 KG/M2 | HEART RATE: 108 BPM

## 2025-03-26 DIAGNOSIS — Z34.81 ENCOUNTER FOR SUPERVISION OF OTHER NORMAL PREGNANCY, FIRST TRIMESTER: Primary | ICD-10-CM

## 2025-03-26 DIAGNOSIS — O34.219 HISTORY OF CESAREAN DELIVERY, CURRENTLY PREGNANT: ICD-10-CM

## 2025-03-26 LAB
ALBUMIN UR-MCNC: NEGATIVE MG/DL
APPEARANCE UR: ABNORMAL
BACTERIA #/AREA URNS HPF: ABNORMAL /HPF
BILIRUB UR QL STRIP: NEGATIVE
COLOR UR AUTO: ABNORMAL
ERYTHROCYTE [DISTWIDTH] IN BLOOD BY AUTOMATED COUNT: 13.2 % (ref 10–15)
EST. AVERAGE GLUCOSE BLD GHB EST-MCNC: 111 MG/DL
GLUCOSE UR STRIP-MCNC: NEGATIVE MG/DL
HBA1C MFR BLD: 5.5 % (ref 0–5.6)
HBV SURFACE AG SERPL QL IA: NONREACTIVE
HCT VFR BLD AUTO: 41.7 % (ref 35–47)
HCV AB SERPL QL IA: NONREACTIVE
HGB BLD-MCNC: 13.7 G/DL (ref 11.7–15.7)
HGB UR QL STRIP: NEGATIVE
KETONES UR STRIP-MCNC: NEGATIVE MG/DL
LEUKOCYTE ESTERASE UR QL STRIP: NEGATIVE
MCH RBC QN AUTO: 26.1 PG (ref 26.5–33)
MCHC RBC AUTO-ENTMCNC: 32.9 G/DL (ref 31.5–36.5)
MCV RBC AUTO: 79 FL (ref 78–100)
NITRATE UR QL: NEGATIVE
PH UR STRIP: 7.5 [PH] (ref 5–7)
PLATELET # BLD AUTO: 301 10E3/UL (ref 150–450)
RBC # BLD AUTO: 5.25 10E6/UL (ref 3.8–5.2)
RBC #/AREA URNS AUTO: ABNORMAL /HPF
RUBV IGG SERPL QL IA: 16.5 INDEX
RUBV IGG SERPL QL IA: POSITIVE
SP GR UR STRIP: 1.01 (ref 1–1.03)
SQUAMOUS #/AREA URNS AUTO: ABNORMAL /LPF
T PALLIDUM AB SER QL: NONREACTIVE
UROBILINOGEN UR STRIP-MCNC: NORMAL MG/DL
WBC # BLD AUTO: 6.1 10E3/UL (ref 4–11)
WBC #/AREA URNS AUTO: ABNORMAL /HPF

## 2025-03-26 PROCEDURE — 86901 BLOOD TYPING SEROLOGIC RH(D): CPT | Performed by: OBSTETRICS & GYNECOLOGY

## 2025-03-26 PROCEDURE — 87491 CHLMYD TRACH DNA AMP PROBE: CPT | Performed by: OBSTETRICS & GYNECOLOGY

## 2025-03-26 PROCEDURE — 86803 HEPATITIS C AB TEST: CPT | Performed by: OBSTETRICS & GYNECOLOGY

## 2025-03-26 PROCEDURE — 86762 RUBELLA ANTIBODY: CPT | Performed by: OBSTETRICS & GYNECOLOGY

## 2025-03-26 PROCEDURE — 85027 COMPLETE CBC AUTOMATED: CPT | Performed by: OBSTETRICS & GYNECOLOGY

## 2025-03-26 PROCEDURE — 87340 HEPATITIS B SURFACE AG IA: CPT | Performed by: OBSTETRICS & GYNECOLOGY

## 2025-03-26 PROCEDURE — 86850 RBC ANTIBODY SCREEN: CPT | Performed by: OBSTETRICS & GYNECOLOGY

## 2025-03-26 PROCEDURE — 81001 URINALYSIS AUTO W/SCOPE: CPT | Performed by: OBSTETRICS & GYNECOLOGY

## 2025-03-26 PROCEDURE — 86780 TREPONEMA PALLIDUM: CPT | Performed by: OBSTETRICS & GYNECOLOGY

## 2025-03-26 PROCEDURE — 36415 COLL VENOUS BLD VENIPUNCTURE: CPT | Performed by: OBSTETRICS & GYNECOLOGY

## 2025-03-26 PROCEDURE — 83036 HEMOGLOBIN GLYCOSYLATED A1C: CPT | Performed by: OBSTETRICS & GYNECOLOGY

## 2025-03-26 PROCEDURE — 86900 BLOOD TYPING SEROLOGIC ABO: CPT | Performed by: OBSTETRICS & GYNECOLOGY

## 2025-03-26 NOTE — PROGRESS NOTES
Dennies is a 29 year old female, , who is here today for her first OB visit at 12 0/7 weeks gestation.  She has had a positive home pregnancy test and already has started taking a PNV daily po.  She has not felt fetal movement yet but will record when this starts.  She requests a TOLAC attempt and declines a PPTL since she is hoping for 2 more pregnancies in the future.  She wants the Prequel test so will collect today.  However, she declines the Foresight test.  Her firs C/S was performed due to arrest of descent and that  weighed 7+ lbs.  Her social hx is negative for nicotine, etoh, and illicit drug abuse.  She understands that her EDC is 10/8 based on her first OB US done here at 5 weeks 6 days.      ROS: Ten point review of systems was reviewed and negative except the above.    Gyn Hx:      Past Medical History:   Diagnosis Date    Known health problems: none     Nexplanon in place 2022    Left arm - [facility]     Past Surgical History:   Procedure Laterality Date     SECTION      HC INSERTION INTRAUTERINE DEVICE      HC REMOVE INTRAUTERINE DEVICE       Patient Active Problem List   Diagnosis    Normal labor     delivery delivered    Lower abdominal pain     uterine contractions     ALL/Meds: Her medication and allergy histories were reviewed and are documented in their appropriate chart areas.    SH: Reviewed and documented in the appropriate area of the chart.    FH: Her family history was reviewed and documented in its appropriate chart area.    PE: /67 (BP Location: Right arm, Patient Position: Sitting, Cuff Size: Adult Regular)   Pulse 108   Wt 63.6 kg (140 lb 3.2 oz)   LMP 2024   BMI 27.38 kg/m    Body mass index is 27.38 kg/m .    Urine HCG was +  Hrt - RRR without murmur  Lungs - CTAB  Neck - supple without palpable mass  Breasts - not examined since dressed  Pelvic - not performed  Abd - soft, nontender, fundal height at the pubic  symphysis, fhts 157 bpm per doppler    A/P:   - I discussed with her nutrition and medication concerns related to pregnancy.  We discussed folic acid supplementation.  We reviewed prenatal care.  She is given the opportunity to ask questions and have them answered.  Check prenatal labwork including the Prequel and the paperwork was completed.  She is to continue taking a PNV daily po.  She will want to schedule a 20-week screening OB US at her next visit and will discuss the optional AFP test at that time.  She prefers to collect her pap at the time of her 6-week postpartum exam.    30 minutes were spent face to face with the patient today discussing her history, diagnosis, and follow-up plan as noted above.  Over 50% of the visit was spent in counseling and coordination of care.    Total Visit Time: 30 minutes.     Orders Placed This Encounter   Procedures    Myriad Non-Invasive Prenatal Screening-Prequel    Neisseria gonorrhoeae PCR    Chlamydia trachomatis PCR     She Burns DO  FACOG, FACS

## 2025-03-27 LAB
C TRACH DNA SPEC QL NAA+PROBE: NEGATIVE
HIV 1+2 AB+HIV1 P24 AG SERPL QL IA: NONREACTIVE
N GONORRHOEA DNA SPEC QL NAA+PROBE: NEGATIVE
SPECIMEN TYPE: NORMAL
SPECIMEN TYPE: NORMAL

## 2025-04-02 ENCOUNTER — TELEPHONE (OUTPATIENT)
Dept: OBGYN | Facility: CLINIC | Age: 30
End: 2025-04-02

## 2025-04-02 LAB — SCANNED LAB RESULT: NORMAL

## 2025-04-02 NOTE — TELEPHONE ENCOUNTER
.[facility]    Phone Message    May a detailed message be left on voicemail: yes     Reason for Call: Requesting Results     Name/type of test: Myraid / Gender   Date of test: 03/26/2025  Was test done at a location other than [facility] (Please fill in the location if not [facility])?:  No    Action Taken: Message routed to:  Other: [facility]    Travel Screening: Not Applicable

## 2025-04-02 NOTE — TELEPHONE ENCOUNTER
Pt is requesting her Myriad results.      Pt had Myriad testing completed on 3/26/25.  It does not appear that the ordering provider, Dr. Burns, has had a chance to review these results yet.    Routing to Dr. Burns to review and interpret AKT genetic testing results so we can relay to pt.    Hortencia Stafford RN- OB/GYN group

## 2025-04-03 NOTE — TELEPHONE ENCOUNTER
She Burns, DO  [facility] [id number] hours ago (5:42 PM)       Please let this patient know that her Prequel results from 3/26/2025 were negative (normal).       Sent pt a inDinero message.    Hortencia Stafford RN- OB/GYN group

## 2025-05-02 NOTE — PATIENT INSTRUCTIONS
If you wish to schedule another appointment, please call our office at [phone]. You can also make appointments through [email or web address]  Return to clinic:     Every 4 weeks till 28 weeks, then every 2 weeks till 36 weeks, then weekly till delivery    If anything comes up between your visits or you have concerns, please don't hesitate to contact us.                                              If you have labs or imaging done, the results will automatically release in [email or web address] without an interpretation.  Your health care professional will review those results and send an interpretation with recommendations as soon as possible, but this may be 1-3 business days.    If you have any questions regarding your visit, please contact your care team.     [facility]: [phone]  Women s Health CLINIC HOURS TELEPHONE NUMBER   She BurnsDOMary    Merry -Surgery Scheduler  Katie -     DANA Burnett, DANA Patrick RN   [place]    Monday 8:30 am-5:00 pm  Wednesday 8:30 am-5:00 pm  Friday 8:30 am-5:00 pm    Typical Surgery day: Tuesday [facility]  [address]  [place] [address]  Phone:  [phone]  Fax: [phone]   Appointment [phone]    Imaging Scheduling-All Locations [phone]    [facility]  [address] [place] [address]   [place] [address] [phone]   **Surgeries** Our Surgery Schedulers will contact you to schedule. If you do not receive a call within 3 business days, please call [phone].  Urgent Care locations:  [facility] Monday-Friday   10 am - 8 pm  Saturday and    9 am - 5 pm [phone]   If you need a medication refill, please contact your pharmacy. Please allow 3 business days for your refill to be completed.  As always, Thank you for trusting us with your healthcare needs!  see additional instructions from your care team below

## 2025-05-05 ENCOUNTER — PRENATAL OFFICE VISIT (OUTPATIENT)
Dept: OBGYN | Facility: CLINIC | Age: 30
End: 2025-05-05
Payer: COMMERCIAL

## 2025-05-05 VITALS
BODY MASS INDEX: 27.54 KG/M2 | SYSTOLIC BLOOD PRESSURE: 94 MMHG | WEIGHT: 141 LBS | HEART RATE: 93 BPM | OXYGEN SATURATION: 100 % | DIASTOLIC BLOOD PRESSURE: 63 MMHG

## 2025-05-05 DIAGNOSIS — Z34.02 ENCOUNTER FOR SUPERVISION OF NORMAL FIRST PREGNANCY IN SECOND TRIMESTER: Primary | ICD-10-CM

## 2025-05-05 PROCEDURE — 99207 PR PRENATAL VISIT: CPT | Performed by: OBSTETRICS & GYNECOLOGY

## 2025-05-05 NOTE — PROGRESS NOTES
She began to feel fetal movement x the past 2 weeks starting at 16 weeks gestation and will continue to record.  She gained 1 lb since her last visit and denies any fluid leakage or regular uterine contractions.  She is planning a TOLAC but declines a PPTL.  She would like to schedule a 20-week screening OB US but declines an AFP so this lab test was not ordered.